# Patient Record
Sex: FEMALE | Race: WHITE | Employment: STUDENT | URBAN - METROPOLITAN AREA
[De-identification: names, ages, dates, MRNs, and addresses within clinical notes are randomized per-mention and may not be internally consistent; named-entity substitution may affect disease eponyms.]

---

## 2018-04-30 ENCOUNTER — OFFICE VISIT (OUTPATIENT)
Dept: PEDIATRICS | Facility: CLINIC | Age: 23
End: 2018-04-30
Payer: COMMERCIAL

## 2018-04-30 ENCOUNTER — TELEPHONE (OUTPATIENT)
Dept: OBGYN | Facility: CLINIC | Age: 23
End: 2018-04-30

## 2018-04-30 VITALS
DIASTOLIC BLOOD PRESSURE: 76 MMHG | HEIGHT: 64 IN | HEART RATE: 99 BPM | OXYGEN SATURATION: 100 % | BODY MASS INDEX: 22.71 KG/M2 | TEMPERATURE: 98.2 F | SYSTOLIC BLOOD PRESSURE: 130 MMHG | WEIGHT: 133 LBS

## 2018-04-30 DIAGNOSIS — S20.162A: Primary | ICD-10-CM
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
## 2018-04-30 DIAGNOSIS — Z34.90 SUPERVISION OF NORMAL PREGNANCY: Primary | ICD-10-CM

## 2018-04-30 PROCEDURE — 99203 OFFICE O/P NEW LOW 30 MIN: CPT | Performed by: PHYSICIAN ASSISTANT

## 2018-04-30 RX ORDER — AMOXICILLIN 500 MG/1
500 CAPSULE ORAL 3 TIMES DAILY
Qty: 30 CAPSULE | Refills: 0 | Status: SHIPPED | OUTPATIENT
Start: 2018-04-30 | End: 2018-08-21

## 2018-04-30 NOTE — TELEPHONE ENCOUNTER
Patient calling:  LMP 3/19/18  GA 6w0d  Was camping this weekend in [facility].  Found a tick under breast today.  Red area a little bigger that a pencil eraser. With a small pink area around red.  She does have the tick. (looks like a wood tick)  Not sure if head is out.  Appt made for pt to be seen today.  Katie Calhoun RN

## 2018-04-30 NOTE — PROGRESS NOTES
"  SUBJECTIVE:   Sylvia Leonard is a 23 year old female who presents to clinic today for the following health issues:    Found tick under left breast today. Removed today.   Hiking over the past two days in [place], [place].   Redness and swelling at site.   No fevers, chills, fatigue.   Pregnant: 6 weeks     ROS:  ROS otherwise negative    OBJECTIVE:                                                    /76 (BP Location: Right arm, Cuff Size: Adult Regular)  Pulse 99  Temp 98.2  F (36.8  C) (Oral)  Ht 5' 4\" (1.626 m)  Wt 133 lb (60.3 kg)  SpO2 100%  BMI 22.83 kg/m2  Body mass index is 22.83 kg/(m^2).   GENERAL: alert, no distress  HENT: ear canals- normal; TMs- normal; Nose- normal; Mouth- no ulcers, no lesions  NECK: no tenderness, no adenopathy  RESP: lungs clear to auscultation - no rales, no rhonchi, no wheezes  CV: regular rates and rhythm, normal S1 S2, no S3 or S4 and no murmur, no click or rub  SKIN: inspection of the left inferior breast reveals a 1 cm round erythemic lesion with lighter erythema surrounding. No tenderness to palpation. No remnants remaining.    Diagnostic test results:  No results found for this or any previous visit (from the past 24 hour(s)).     ASSESSMENT/PLAN:                                                    (S20.162A,  W57.XXXA) Tick bite of left female breast, initial encounter  (primary encounter diagnosis)  Comment: patient brought in live deer tick--slightly engorged. Proceed with treatment.   Plan: amoxicillin (AMOXIL) 500 MG capsule          Deejay Mohan PA-C  [facility]  "

## 2018-04-30 NOTE — PROGRESS NOTES
"  SUBJECTIVE:   Sylvia Leonard is a 23 year old female who presents to clinic today for the following health issues:      Concern - insect bite  Onset: ***    Description:   ***    Intensity: {.:689699}    Progression of Symptoms:  {.:399272}    Accompanying Signs & Symptoms:  ***    Previous history of similar problem:   ***    Precipitating factors:   Worsened by: ***    Alleviating factors:  Improved by: ***    Therapies Tried and outcome: ***    {additional problems for provider to add:594198}    Problem list and histories reviewed & adjusted, as indicated.  Additional history: {NONE - AS DOCUMENTED:044638::\"as documented\"}    {HIST REVIEW/ LINKS 2:693232}    Reviewed and updated as needed this visit by clinical staff       Reviewed and updated as needed this visit by Provider         {PROVIDER CHARTING PREFERENCE:631597}  "

## 2018-04-30 NOTE — MR AVS SNAPSHOT
After Visit Summary   4/30/2018    Sylvia Leonard    MRN: [id number]           Patient Information     Date Of Birth          1995        Visit Information        Provider Department      4/30/2018 4:30 PM Deejay Mohan PA-C [facility]        Today's Diagnoses     Tick bite of left female breast, initial encounter    -  1      Care Instructions    Begin antibiotics   Take with food  Call with any questions          Follow-ups after your visit        Your next 10 appointments already scheduled     Apr 30, 2018  4:30 PM CDT   SHORT with Deejay Mohan PA-C   [facility] ([facility])    [address]  [address]  [facility] [address]   [phone]            May 10, 2018  9:00 AM CDT   New Prenatal with RI PRENATAL NURSE   [facility]    [address] [place] [address] [phone]            May 15, 2018  1:15 PM CDT   (Arrive by 1:00 PM)   Ultrasound with RIUS1   [facility]    [address]  [address]  [facility] [address] [phone]            May 29, 2018  1:30 PM CDT   New Prenatal with Amada Krause MD   [facility]    [address] [place] [address] [phone]              Future tests that were ordered for you today     Open Future Orders        Priority Expected Expires Ordered    Hepatitis B surface antigen Routine  6/30/2018 4/30/2018    CBC with platelets Routine  6/30/2018 4/30/2018    Anti Treponema Routine  6/30/2018 4/30/2018    ABO/Rh type and screen Routine  6/30/2018 4/30/2018    HIV Antigen Antibody Combo Routine  6/30/2018 4/30/2018    Beta HCG qual IFA urine - [facility] and [place] Routine  6/30/2018 4/30/2018    Rubella Antibody IgG Quantitative Routine  6/30/2018 4/30/2018    Urine Culture Aerobic  "Bacterial Routine  2018            Who to contact     If you have questions or need follow up information about today's clinic visit or your schedule please contact [facility] directly at [phone].  Normal or non-critical lab and imaging results will be communicated to you by MyChart, letter or phone within 4 business days after the clinic has received the results. If you do not hear from us within 7 days, please contact the clinic through MyChart or phone. If you have a critical or abnormal lab result, we will notify you by phone as soon as possible.  Submit refill requests through [email or web address] or call your pharmacy and they will forward the refill request to us. Please allow 3 business days for your refill to be completed.          Additional Information About Your Visit        [email or web address] Information     [email or web address] lets you send messages to your doctor, view your test results, renew your prescriptions, schedule appointments and more. To sign up, go to [email or web address] . Click on \"Log in\" on the left side of the screen, which will take you to the Welcome page. Then click on \"Sign up Now\" on the right side of the page.     You will be asked to enter the access code listed below, as well as some personal information. Please follow the directions to create your username and password.     Your access code is: [email or web address]  Expires: 2018  4:18 PM     Your access code will  in 90 days. If you need help or a new code, please call your [place] clinic or [phone].        Care EveryWhere ID     This is your Care EveryWhere ID. This could be used by other organizations to access your [place] medical records  [id number]        Your Vitals Were     Pulse Temperature Height Pulse Oximetry BMI (Body Mass Index)       99 98.2  F (36.8  C) (Oral) 5' 4\" (1.626 m) 100% 22.83 kg/m2        Blood Pressure from Last 3 Encounters:   18 130/76    Weight from Last 3 Encounters:   18 " 133 lb (60.3 kg)              Today, you had the following     No orders found for display         Today's Medication Changes          These changes are accurate as of 4/30/18  4:18 PM.  If you have any questions, ask your nurse or doctor.               Start taking these medicines.        Dose/Directions    amoxicillin 500 MG capsule   Commonly known as:  AMOXIL   Used for:  Tick bite of left female breast, initial encounter   Started by:  Deejay Mohan PA-C        Dose:  500 mg   Take 1 capsule (500 mg) by mouth 3 times daily   Quantity:  30 capsule   Refills:  0            Where to get your medicines      These medications were sent to [place] [address] [place] [facility] [id number] [facility] [address]    Hours:  Tech issues with their phone system Phone:  [phone]     amoxicillin 500 MG capsule                Primary Care Provider Fax #    Physician No Ref-Primary [phone]       No address on file        Equal Access to Services     [facility] : Hadturner Hines, waaxda ludelphine, qaybta kaalmada adejoséyalatosha, milagros cabrera . So [facility] [phone].    ATENCIÓN: Si habla español, tiene a arzate disposición servicios gratuitos de asistencia lingüística. Wally al [phone].    We comply with applicable federal civil rights laws and [place] laws. We do not discriminate on the basis of race, color, national origin, age, disability, sex, sexual orientation, or gender identity.            Thank you!     Thank you for choosing [facility]  for your care. Our goal is always to provide you with excellent care. Hearing back from our patients is one way we can continue to improve our services. Please take a few minutes to complete the written survey that you may receive in the mail after your visit with us. Thank you!             Your Updated Medication List - Protect others around you: Learn how to safely use, store and  throw away your medicines at www.disposemymeds.org.          This list is accurate as of 4/30/18  4:18 PM.  Always use your most recent med list.                   Brand Name Dispense Instructions for use Diagnosis    amoxicillin 500 MG capsule    AMOXIL    30 capsule    Take 1 capsule (500 mg) by mouth 3 times daily    Tick bite of left female breast, initial encounter

## 2018-05-10 ENCOUNTER — PRENATAL OFFICE VISIT (OUTPATIENT)
Dept: NURSING | Facility: CLINIC | Age: 23
End: 2018-05-10
Payer: COMMERCIAL

## 2018-05-10 DIAGNOSIS — Z34.90 SUPERVISION OF NORMAL PREGNANCY: Primary | ICD-10-CM

## 2018-05-10 LAB
ABO + RH BLD: NORMAL
ABO + RH BLD: NORMAL
BETA HCG QUAL IFA URINE: POSITIVE
BLD GP AB SCN SERPL QL: NORMAL
BLOOD BANK CMNT PATIENT-IMP: NORMAL
ERYTHROCYTE [DISTWIDTH] IN BLOOD BY AUTOMATED COUNT: 11.9 % (ref 10–15)
HCT VFR BLD AUTO: 35.8 % (ref 35–47)
HGB BLD-MCNC: 12.8 G/DL (ref 11.7–15.7)
MCH RBC QN AUTO: 31.9 PG (ref 26.5–33)
MCHC RBC AUTO-ENTMCNC: 35.8 G/DL (ref 31.5–36.5)
MCV RBC AUTO: 89 FL (ref 78–100)
PLATELET # BLD AUTO: 249 10E9/L (ref 150–450)
RBC # BLD AUTO: 4.01 10E12/L (ref 3.8–5.2)
SPECIMEN EXP DATE BLD: NORMAL
WBC # BLD AUTO: 5.1 10E9/L (ref 4–11)

## 2018-05-10 PROCEDURE — 87086 URINE CULTURE/COLONY COUNT: CPT | Performed by: OBSTETRICS & GYNECOLOGY

## 2018-05-10 PROCEDURE — 86900 BLOOD TYPING SEROLOGIC ABO: CPT | Performed by: OBSTETRICS & GYNECOLOGY

## 2018-05-10 PROCEDURE — 85027 COMPLETE CBC AUTOMATED: CPT | Performed by: OBSTETRICS & GYNECOLOGY

## 2018-05-10 PROCEDURE — 86762 RUBELLA ANTIBODY: CPT | Performed by: OBSTETRICS & GYNECOLOGY

## 2018-05-10 PROCEDURE — 87340 HEPATITIS B SURFACE AG IA: CPT | Performed by: OBSTETRICS & GYNECOLOGY

## 2018-05-10 PROCEDURE — 86901 BLOOD TYPING SEROLOGIC RH(D): CPT | Performed by: OBSTETRICS & GYNECOLOGY

## 2018-05-10 PROCEDURE — 84703 CHORIONIC GONADOTROPIN ASSAY: CPT | Performed by: OBSTETRICS & GYNECOLOGY

## 2018-05-10 PROCEDURE — 86850 RBC ANTIBODY SCREEN: CPT | Performed by: OBSTETRICS & GYNECOLOGY

## 2018-05-10 PROCEDURE — 86780 TREPONEMA PALLIDUM: CPT | Performed by: OBSTETRICS & GYNECOLOGY

## 2018-05-10 PROCEDURE — 99207 ZZC NO CHARGE NURSE ONLY: CPT

## 2018-05-10 PROCEDURE — 87389 HIV-1 AG W/HIV-1&-2 AB AG IA: CPT | Performed by: OBSTETRICS & GYNECOLOGY

## 2018-05-10 PROCEDURE — 36415 COLL VENOUS BLD VENIPUNCTURE: CPT | Performed by: OBSTETRICS & GYNECOLOGY

## 2018-05-10 RX ORDER — PRENATAL VIT/IRON FUM/FOLIC AC 27MG-0.8MG
1 TABLET ORAL DAILY
Qty: 100 TABLET | Refills: 3 | COMMUNITY
Start: 2018-05-10

## 2018-05-10 NOTE — MR AVS SNAPSHOT
After Visit Summary   5/10/2018    Sylvia Leonard    MRN: [id number]           Patient Information     Date Of Birth          1995        Visit Information        Provider Department      5/10/2018 9:00 AM RI PRENATAL NURSE [facility]        Today's Diagnoses     Supervision of normal pregnancy    -  1       Follow-ups after your visit        Your next 10 appointments already scheduled     May 15, 2018  1:15 PM CDT   Ultrasound with RIUS1   [facility]    [address]  [facility] [id number] [phone]            May 29, 2018  1:30 PM CDT   New Prenatal with Amada Krause MD   [facility]    [address] [place] [address] [phone]              Future tests that were ordered for you today     Open Future Orders        Priority Expected Expires Ordered    US OB <14 Weeks w Transvaginal Single Routine  5/10/2019 5/10/2018            Who to contact     If you have questions or need follow up information about today's clinic visit or your schedule please contact [facility] directly at [phone].  Normal or non-critical lab and imaging results will be communicated to you by MyChart, letter or phone within 4 business days after the clinic has received the results. If you do not hear from us within 7 days, please contact the clinic through Around Knowledgehart or phone. If you have a critical or abnormal lab result, we will notify you by phone as soon as possible.  Submit refill requests through [employer] or call your pharmacy and they will forward the refill request to us. Please allow 3 business days for your refill to be completed.          Additional Information About Your Visit        MyChart Information     [employer] gives you secure access to your electronic health record. If you see a primary care provider, you can also send  messages to your care team and make appointments. If you have questions, please call your primary care clinic.  If you do not have a primary care provider, please call [phone] and they will assist you.        Care EveryWhere ID     This is your Care EveryWhere ID. This could be used by other organizations to access your [place] medical records  [id number]        Your Vitals Were     Last Period                   03/19/2018 (Exact Date)            Blood Pressure from Last 3 Encounters:   04/30/18 130/76    Weight from Last 3 Encounters:   04/30/18 133 lb (60.3 kg)              We Performed the Following     ABO/Rh type and screen     Beta HCG qual IFA urine - [facility] and [place]     CBC with platelets     Hepatitis B surface antigen     HIV Antigen Antibody Combo     Rubella Antibody IgG Quantitative     Treponema Abs w Reflex to RPR and Titer     Urine Culture Aerobic Bacterial        Primary Care Provider Fax #    Physician No Ref-Primary [phone]       No address on file        Equal Access to Services     CELIA KHAN : Hadii aad ku hadasho Soomaali, waaxda luqadaha, qaybta kaalmada adeegyada, waxay dkin hayhuyn louann cabrera . So [facility] [phone].    ATENCIÓN: Si habla español, tiene a arzate disposición servicios gratuitos de asistencia lingüística. Arletame al [phone].    We comply with applicable federal civil rights laws and [place] laws. We do not discriminate on the basis of race, color, national origin, age, disability, sex, sexual orientation, or gender identity.            Thank you!     Thank you for choosing [facility]  for your care. Our goal is always to provide you with excellent care. Hearing back from our patients is one way we can continue to improve our services. Please take a few minutes to complete the written survey that you may receive in the mail after your visit with us. Thank you!             Your Updated Medication List - Protect others around you:  Learn how to safely use, store and throw away your medicines at www.disposemymeds.org.          This list is accurate as of 5/10/18 10:41 AM.  Always use your most recent med list.                   Brand Name Dispense Instructions for use Diagnosis    amoxicillin 500 MG capsule    AMOXIL    30 capsule    Take 1 capsule (500 mg) by mouth 3 times daily    Tick bite of left female breast, initial encounter       prenatal multivitamin plus iron 27-0.8 MG Tabs per tablet     100 tablet    Take 1 tablet by mouth daily

## 2018-05-10 NOTE — NURSING NOTE
NPN nurse visit. 1st dr visit scheduled for 5/29/18 with Amada Krause M.D. Pap due.   7w3d      MATT Vogel RN

## 2018-05-11 LAB
BACTERIA SPEC CULT: NORMAL
HBV SURFACE AG SERPL QL IA: NONREACTIVE
HIV 1+2 AB+HIV1 P24 AG SERPL QL IA: NONREACTIVE
RUBV IGG SERPL IA-ACNC: 26 IU/ML
SPECIMEN SOURCE: NORMAL
T PALLIDUM AB SER QL: NONREACTIVE

## 2018-05-15 ENCOUNTER — HEALTH MAINTENANCE LETTER (OUTPATIENT)
Age: 23
End: 2018-05-15

## 2018-05-15 DIAGNOSIS — Z34.90 SUPERVISION OF NORMAL PREGNANCY: ICD-10-CM

## 2018-05-29 ENCOUNTER — PRENATAL OFFICE VISIT (OUTPATIENT)
Dept: OBGYN | Facility: CLINIC | Age: 23
End: 2018-05-29
Payer: COMMERCIAL

## 2018-05-29 VITALS
DIASTOLIC BLOOD PRESSURE: 70 MMHG | SYSTOLIC BLOOD PRESSURE: 122 MMHG | BODY MASS INDEX: 22.88 KG/M2 | HEIGHT: 64 IN | WEIGHT: 134 LBS

## 2018-05-29 DIAGNOSIS — Z12.4 SCREENING FOR MALIGNANT NEOPLASM OF CERVIX: ICD-10-CM

## 2018-05-29 DIAGNOSIS — Z11.3 SCREEN FOR STD (SEXUALLY TRANSMITTED DISEASE): ICD-10-CM

## 2018-05-29 DIAGNOSIS — Z34.81 ENCOUNTER FOR SUPERVISION OF OTHER NORMAL PREGNANCY IN FIRST TRIMESTER: Primary | ICD-10-CM

## 2018-05-29 PROBLEM — Z34.80 SUPERVISION OF OTHER NORMAL PREGNANCY, ANTEPARTUM: Status: ACTIVE | Noted: 2018-05-29

## 2018-05-29 PROCEDURE — 87591 N.GONORRHOEAE DNA AMP PROB: CPT | Performed by: OBSTETRICS & GYNECOLOGY

## 2018-05-29 PROCEDURE — 99207 ZZC FIRST OB VISIT: CPT | Performed by: OBSTETRICS & GYNECOLOGY

## 2018-05-29 PROCEDURE — 87491 CHLMYD TRACH DNA AMP PROBE: CPT | Performed by: OBSTETRICS & GYNECOLOGY

## 2018-05-29 PROCEDURE — G0145 SCR C/V CYTO,THINLAYER,RESCR: HCPCS | Performed by: OBSTETRICS & GYNECOLOGY

## 2018-05-29 NOTE — MR AVS SNAPSHOT
After Visit Summary   5/29/2018    Sylvia Leonard    MRN: [id number]           Patient Information     Date Of Birth          1995        Visit Information        Provider Department      5/29/2018 1:30 PM Amada Krause MD [facility]        Today's Diagnoses     Encounter for supervision of other normal pregnancy in first trimester    -  1    Screening for malignant neoplasm of cervix        Screen for STD (sexually transmitted disease)           Follow-ups after your visit        Your next 10 appointments already scheduled     Jun 26, 2018 11:00 AM CDT   ESTABLISHED PRENATAL with [facility]ada Krause MD   [facility]    [address]  [facility] [address]   [phone]            Jul 24, 2018  3:00 PM CDT   ESTABLISHED PRENATAL with Amada Krause MD   [facility]    [address]  [facility] [address]   [phone]              Future tests that were ordered for you today     Open Future Orders        Priority Expected Expires Ordered    Non Invasive Prenatal Test Cell Free DNA Routine  5/29/2019 5/29/2018            Who to contact     If you have questions or need follow up information about today's clinic visit or your schedule please contact [facility] directly at [phone].  Normal or non-critical lab and imaging results will be communicated to you by MyChart, letter or phone within 4 business days after the clinic has received the results. If you do not hear from us within 7 days, please contact the clinic through MyChart or phone. If you have a critical or abnormal lab result, we will notify you by phone as soon as possible.  Submit refill requests through Active Endpoints or call your pharmacy and they will forward the refill request to us. Please allow 3 business days for your refill to be completed.          Additional Information About Your  "Visit        MyChart Information     InVenturehart gives you secure access to your electronic health record. If you see a primary care provider, you can also send messages to your care team and make appointments. If you have questions, please call your primary care clinic.  If you do not have a primary care provider, please call [phone] and they will assist you.        Care EveryWhere ID     This is your Care EveryWhere ID. This could be used by other organizations to access your [place] medical records  [id number]        Your Vitals Were     Height Last Period Breastfeeding? BMI (Body Mass Index)          5' 4\" (1.626 m) 03/19/2018 (Exact Date) No 23 kg/m2         Blood Pressure from Last 3 Encounters:   05/29/18 122/70   04/30/18 130/76    Weight from Last 3 Encounters:   05/29/18 134 lb (60.8 kg)   04/30/18 133 lb (60.3 kg)              We Performed the Following     CHLAMYDIA TRACHOMATIS PCR     NEISSERIA GONORRHOEA PCR     Pap imaged thin layer screen reflex to HPV if ASCUS - recommend age 25 - 29        Primary Care Provider Fax #    Physician No Ref-Primary [phone]       No address on file        Equal Access to Services     [facility] : Hadii lorenzo sandoval Sosamir, waaxda ludelphine, qaybta kaalmada adam, milagros cabrera . So [facility] [phone].    ATENCIÓN: Si habla español, tiene a arzate disposición servicios gratuitos de asistencia lingüística. Llame al [phone].    We comply with applicable federal civil rights laws and [place] laws. We do not discriminate on the basis of race, color, national origin, age, disability, sex, sexual orientation, or gender identity.            Thank you!     Thank you for choosing [facility]  for your care. Our goal is always to provide you with excellent care. Hearing back from our patients is one way we can continue to improve our services. Please take a few minutes to complete the written survey that you may " receive in the mail after your visit with us. Thank you!             Your Updated Medication List - Protect others around you: Learn how to safely use, store and throw away your medicines at www.disposemymeds.org.          This list is accurate as of 5/29/18  2:39 PM.  Always use your most recent med list.                   Brand Name Dispense Instructions for use Diagnosis    amoxicillin 500 MG capsule    AMOXIL    30 capsule    Take 1 capsule (500 mg) by mouth 3 times daily    Tick bite of left female breast, initial encounter       prenatal multivitamin plus iron 27-0.8 MG Tabs per tablet     100 tablet    Take 1 tablet by mouth daily

## 2018-05-29 NOTE — PROGRESS NOTES
This is a 23 year old female patient,   who presents for her first obstetrical visit. This pregnancy is Planned, Desired.    EDC Dec 24, 2018 by LMP and Previous US which makes her 10w1d  today.  Her cycles are regular.  Her last menstrual period was normal. Since her LMP, she has had no complaints.  She denies nausea and emesis. Ultrasound in the 1st trimester showed EDC consistent with dates by LMP.     This is her first pregnancy    Since her last LMP she denies use of alcohol, tobacco and street drugs.    HISTORY:  Obstetric History       T0      L0     SAB0   TAB0   Ectopic0   Multiple0   Live Births0       # Outcome Date GA Lbr Jose/2nd Weight Sex Delivery Anes PTL Lv   2 Current            1 AB 10/2013 5w0d               Past Medical History:   Diagnosis Date     Migraine      Past Surgical History:   Procedure Laterality Date     NO HISTORY OF SURGERY       Family History   Problem Relation Age of Onset     Hypertension Mother      Hyperlipidemia Mother      CEREBROVASCULAR DISEASE Maternal Grandmother      Social History     Social History     Marital status:      Spouse name: Jose Manuel     Number of children: N/A     Years of education: N/A     Social History Main Topics     Smoking status: Never Smoker     Smokeless tobacco: Never Used     Alcohol use No     Drug use: No     Sexual activity: Yes     Partners: Male     Other Topics Concern     None     Social History Narrative     Current Outpatient Prescriptions   Medication Sig     Prenatal Vit-Fe Fumarate-FA (PRENATAL MULTIVITAMIN PLUS IRON) 27-0.8 MG TABS per tablet Take 1 tablet by mouth daily     amoxicillin (AMOXIL) 500 MG capsule Take 1 capsule (500 mg) by mouth 3 times daily (Patient not taking: Reported on 2018)     No current facility-administered medications for this visit.      No Known Allergies    Past medical, surgical, social and family history were reviewed and updated in EPIC.    ROS:   12 point review of  "systems negative other than symptoms noted below.    EXAM:  /70  Ht 5' 4\" (1.626 m)  Wt 134 lb (60.8 kg)  LMP 03/19/2018 (Exact Date)  Breastfeeding? No  BMI 23 kg/m2   BMI: Body mass index is 23 kg/(m^2).    EXAM:  Constitutional: Appearance: Well nourished, well developed alert, in no acute distress  Chest:  Respiratory Effort:  Breathing unlabored  Cardiovascular:Heart    Auscultation:  Regular rate, normal rhythm, no murmurs present  Gastrointestinal:  Abdominal Examination:  Abdomen nontender to palpation, tone normal without     rigidity or guarding, no masses present, umbilicus without lesions    Liver and speen:  No hepatomegaly present, liver nontender to palpation    Hernias:  No hernias present    FHTs auscultated at 178.  Skin:  General Inspection:  No rashes present, no lesions present, no areas of  discoloration.  Neurologic/Psychiatric:    Mental Status:  Oriented X3       Pelvis: External genitalia, Bartholin, urethral, and [place] glands within normal limits. Urethra is without lesion and nontender to palpation. Bladder is nontender. On speculum exam, cervix is without lesion and vagina is normal without lesion or discharge. Pap smear obtained without incident.    ASSESSMENT:      ICD-10-CM    1. Encounter for supervision of other normal pregnancy in first trimester Z34.81        PLAN:    Prenatal labs reviewed. She has no questions.    Discussed options for screening for and diagnosis of chromosomal anomalies, including first trimester screen, noninvasive prenatal testing/cell-free fetal DNA testing, CVS/amniocentesis, quad screen, and ultrasound or comprehensive Level II US at 18-20 weeks. She is electing noninvasive prenatal testing and Preparent screening as well.    Reviewed early pregnancy education, diet, exercise, prenatal vitamins, intercourse. Reviewed the call schedule, labor and delivery, and the schedule of prenatal visits.    Follow up in 4 weeks. She is encouraged to call " sooner with questions or concerns.      Amada Krause MD

## 2018-05-29 NOTE — NURSING NOTE
"Chief Complaint   Patient presents with     Prenatal Care       Initial /70  Ht 5' 4\" (1.626 m)  Wt 134 lb (60.8 kg)  LMP 2018 (Exact Date)  Breastfeeding? No  BMI 23 kg/m2 Estimated body mass index is 23 kg/(m^2) as calculated from the following:    Height as of this encounter: 5' 4\" (1.626 m).    Weight as of this encounter: 134 lb (60.8 kg).  BP completed using cuff size: regular        The following HM Due: pap smear      10w1d  - nausea or vomiting  - bleeding  + mild cramping  + occas. Headache  + fatigue  Zehra Jain LPN               "

## 2018-05-30 LAB
C TRACH DNA SPEC QL NAA+PROBE: NEGATIVE
N GONORRHOEA DNA SPEC QL NAA+PROBE: NEGATIVE
SPECIMEN SOURCE: NORMAL
SPECIMEN SOURCE: NORMAL

## 2018-05-31 LAB
COPATH REPORT: NORMAL
PAP: NORMAL

## 2018-06-04 ENCOUNTER — OFFICE VISIT (OUTPATIENT)
Dept: NURSING | Facility: CLINIC | Age: 23
End: 2018-06-04
Payer: COMMERCIAL

## 2018-06-04 VITALS — HEIGHT: 64 IN | BODY MASS INDEX: 23.23 KG/M2 | WEIGHT: 136.1 LBS

## 2018-06-04 DIAGNOSIS — Z34.81 ENCOUNTER FOR SUPERVISION OF OTHER NORMAL PREGNANCY IN FIRST TRIMESTER: ICD-10-CM

## 2018-06-04 DIAGNOSIS — Z34.80 SUPERVISION OF OTHER NORMAL PREGNANCY, ANTEPARTUM: Primary | ICD-10-CM

## 2018-06-04 LAB — MISCELLANEOUS TEST: NORMAL

## 2018-06-04 PROCEDURE — 36415 COLL VENOUS BLD VENIPUNCTURE: CPT | Performed by: OBSTETRICS & GYNECOLOGY

## 2018-06-04 PROCEDURE — 99000 SPECIMEN HANDLING OFFICE-LAB: CPT | Performed by: OBSTETRICS & GYNECOLOGY

## 2018-06-04 PROCEDURE — 40000791 ZZHCL STATISTIC VERIFI PRENATAL TRISOMY 21,18,13: Mod: 90 | Performed by: OBSTETRICS & GYNECOLOGY

## 2018-06-04 PROCEDURE — 99207 ZZC NO CHARGE NURSE ONLY: CPT

## 2018-06-04 NOTE — NURSING NOTE
Nurse only Preparent Standard panel and Innatal testing. Progenity form completed and orders released. Pt sent to lab for blood draw. Copy of order placed in RN basket.  Radha Carvalho CMA

## 2018-06-04 NOTE — MR AVS SNAPSHOT
After Visit Summary   6/4/2018    Sylvia Leonard    MRN: [id number]           Patient Information     Date Of Birth          1995        Visit Information        Provider Department      6/4/2018 9:30 AM RI OB NURSE [facility]        Today's Diagnoses     Supervision of other normal pregnancy, antepartum    -  1    Encounter for supervision of other normal pregnancy in first trimester           Follow-ups after your visit        Your next 10 appointments already scheduled     Jun 26, 2018 11:00 AM CDT   ESTABLISHED PRENATAL with Amada Karuse MD   [facility] ([facility])    [address]  [facility] [id number] [phone]            Jul 24, 2018  3:00 PM CDT   ESTABLISHED PRENATAL with Amada Krause MD   [facility] ([facility])    [address]  [facility] [id number] [phone]              Who to contact     If you have questions or need follow up information about today's clinic visit or your schedule please contact [facility] directly at [phone].  Normal or non-critical lab and imaging results will be communicated to you by Nanotronics Imaginghart, letter or phone within 4 business days after the clinic has received the results. If you do not hear from us within 7 days, please contact the clinic through Mobimt or phone. If you have a critical or abnormal lab result, we will notify you by phone as soon as possible.  Submit refill requests through CrowdTunes or call your pharmacy and they will forward the refill request to us. Please allow 3 business days for your refill to be completed.          Additional Information About Your Visit        Nanotronics Imaginghart Information     CrowdTunes gives you secure access to your electronic health record. If you see a primary care provider, you can also send messages to your care team and make appointments. If you have questions, please call your  "primary care clinic.  If you do not have a primary care provider, please call [phone] and they will assist you.        Care EveryWhere ID     This is your Care EveryWhere ID. This could be used by other organizations to access your [place] medical records  [id number]        Your Vitals Were     Height Last Period BMI (Body Mass Index)             5' 4\" (1.626 m) 03/19/2018 (Exact Date) 23.36 kg/m2          Blood Pressure from Last 3 Encounters:   05/29/18 122/70   04/30/18 130/76    Weight from Last 3 Encounters:   06/04/18 136 lb 1.6 oz (61.7 kg)   05/29/18 134 lb (60.8 kg)   04/30/18 133 lb (60.3 kg)              We Performed the Following     Non Invasive Prenatal Test Cell Free DNA     Preparent screening: Laboratory Miscellaneous Order        Primary Care Provider Fax #    Physician No Ref-Primary [phone]       No address on file        Equal Access to Services     CELIA KHAN : Hadturner núñezo Sosamir, waaxda luqadaha, qaybta kaalmada adeflo, milagros cabrera . So [facility] [phone].    ATENCIÓN: Si cathyla hafsa, tiene a arzate disposición servicios gratuitos de asistencia lingüística. Llame al [phone].    We comply with applicable federal civil rights laws and [place] laws. We do not discriminate on the basis of race, color, national origin, age, disability, sex, sexual orientation, or gender identity.            Thank you!     Thank you for choosing [facility]  for your care. Our goal is always to provide you with excellent care. Hearing back from our patients is one way we can continue to improve our services. Please take a few minutes to complete the written survey that you may receive in the mail after your visit with us. Thank you!             Your Updated Medication List - Protect others around you: Learn how to safely use, store and throw away your medicines at www.disposemymeds.org.          This list is accurate as of 6/4/18  9:40 AM. "  Always use your most recent med list.                   Brand Name Dispense Instructions for use Diagnosis    amoxicillin 500 MG capsule    AMOXIL    30 capsule    Take 1 capsule (500 mg) by mouth 3 times daily    Tick bite of left female breast, initial encounter       prenatal multivitamin plus iron 27-0.8 MG Tabs per tablet     100 tablet    Take 1 tablet by mouth daily

## 2018-06-11 ENCOUNTER — TELEPHONE (OUTPATIENT)
Dept: OBGYN | Facility: CLINIC | Age: 23
End: 2018-06-11

## 2018-06-11 DIAGNOSIS — Z34.80 SUPERVISION OF OTHER NORMAL PREGNANCY, ANTEPARTUM: ICD-10-CM

## 2018-06-11 NOTE — TELEPHONE ENCOUNTER
Results received from [facility] testing in [facility].  Testing done:  Innatal Prenatal Screen and Preparent Carrier Screen    Action:  Spoke to pt and gave NORMAL results.    Gender:  **GIRL**  Gender revealed to pt on the phone.    Routed to provider as JONY Vogel RN

## 2018-06-26 ENCOUNTER — PRENATAL OFFICE VISIT (OUTPATIENT)
Dept: OBGYN | Facility: CLINIC | Age: 23
End: 2018-06-26
Payer: COMMERCIAL

## 2018-06-26 VITALS — BODY MASS INDEX: 23.17 KG/M2 | DIASTOLIC BLOOD PRESSURE: 68 MMHG | WEIGHT: 135 LBS | SYSTOLIC BLOOD PRESSURE: 128 MMHG

## 2018-06-26 DIAGNOSIS — Z34.80 SUPERVISION OF OTHER NORMAL PREGNANCY, ANTEPARTUM: ICD-10-CM

## 2018-06-26 PROCEDURE — 99207 ZZC PRENATAL VISIT: CPT | Performed by: OBSTETRICS & GYNECOLOGY

## 2018-06-26 NOTE — NURSING NOTE
"Chief Complaint   Patient presents with     Prenatal Care       Initial /68  Wt 135 lb (61.2 kg)  LMP 2018 (Exact Date)  Breastfeeding? No  BMI 23.17 kg/m2 Estimated body mass index is 23.17 kg/(m^2) as calculated from the following:    Height as of 18: 5' 4\" (1.626 m).    Weight as of this encounter: 135 lb (61.2 kg).  BP completed using cuff size: regular        14w1d  + headache - will try taking Tylenol  - cramping or bleeding  - nausea or vomiting  + fatigue - improving  Zehra Jain LPN               "

## 2018-06-26 NOTE — MR AVS SNAPSHOT
After Visit Summary   6/26/2018    Sylvia Leonard    MRN: [id number]           Patient Information     Date Of Birth          1995        Visit Information        Provider Department      6/26/2018 11:00 AM Amada Krause MD [facility]        Today's Diagnoses     Supervision of other normal pregnancy, antepartum           Follow-ups after your visit        Your next 10 appointments already scheduled     Jul 26, 2018  2:00 PM CDT   ESTABLISHED PRENATAL with Amada Krause MD   [facility] ([facility])    [address] [place] [address]   [phone]            Aug 08, 2018  9:00 AM CDT   US OB > 14 WEEKS COMPLETE SINGLE with RIUS1   [facility] ([facility])    [address]  [address]  [facility] [address] [phone]           Please bring a list of your medicines (including vitamins, minerals and over-the-counter drugs). Also, tell your doctor about any allergies you may have. Wear comfortable clothes and leave your valuables at home.  If you re less than 20 weeks drink four 8-ounce glasses of fluid an hour before your exam. If you need to empty your bladder before your exam, try to release only a little urine. Then, drink another glass of fluid.  You may have up to two family members in the exam room. If you bring a small child, an adult must be there to care for him or her.  Please call the Imaging Department at your exam site with any questions.            Aug 21, 2018 10:30 AM CDT   ESTABLISHED PRENATAL with Amada Krause MD   [facility] ([facility])    [address] [facility] [address]   [phone]            Sep 18, 2018  2:15 PM CDT   ESTABLISHED PRENATAL with Amada Krause MD   [facility] ([facility])    [address]  [facility] [address]   [phone]               Future tests that were ordered for you today     Open Future Orders        Priority Expected Expires Ordered    US OB > 14 Weeks Complete Single Routine  6/26/2019 6/26/2018            Who to contact     If you have questions or need follow up information about today's clinic visit or your schedule please contact [facility] directly at [phone].  Normal or non-critical lab and imaging results will be communicated to you by MyChart, letter or phone within 4 business days after the clinic has received the results. If you do not hear from us within 7 days, please contact the clinic through [employer]hart or phone. If you have a critical or abnormal lab result, we will notify you by phone as soon as possible.  Submit refill requests through [employer] or call your pharmacy and they will forward the refill request to us. Please allow 3 business days for your refill to be completed.          Additional Information About Your Visit        MyChart Information     [employer] gives you secure access to your electronic health record. If you see a primary care provider, you can also send messages to your care team and make appointments. If you have questions, please call your primary care clinic.  If you do not have a primary care provider, please call [phone] and they will assist you.        Care EveryWhere ID     This is your Care EveryWhere ID. This could be used by other organizations to access your [place] medical records  [id number]        Your Vitals Were     Last Period Breastfeeding? BMI (Body Mass Index)             03/19/2018 (Exact Date) No 23.17 kg/m2          Blood Pressure from Last 3 Encounters:   06/26/18 128/68   05/29/18 122/70   04/30/18 130/76    Weight from Last 3 Encounters:   06/26/18 135 lb (61.2 kg)   06/04/18 136 lb 1.6 oz (61.7 kg)   05/29/18 134 lb (60.8 kg)               Primary Care Provider Fax #    Physician No Ref-Primary [phone]       No address on file        Equal  Access to Services     [facility]: Hadii aad ku hademmanuelmynor Hines, wanayanada luqadaha, qaybta kasilvianomilagros hall. So [facility] [phone].    ATENCIÓN: Si habla español, tiene a arzate disposición servicios gratuitos de asistencia lingüística. Llame al [phone].    We comply with applicable federal civil rights laws and [place] laws. We do not discriminate on the basis of race, color, national origin, age, disability, sex, sexual orientation, or gender identity.            Thank you!     Thank you for choosing [facility]  for your care. Our goal is always to provide you with excellent care. Hearing back from our patients is one way we can continue to improve our services. Please take a few minutes to complete the written survey that you may receive in the mail after your visit with us. Thank you!             Your Updated Medication List - Protect others around you: Learn how to safely use, store and throw away your medicines at www.disposemymeds.org.          This list is accurate as of 6/26/18  1:29 PM.  Always use your most recent med list.                   Brand Name Dispense Instructions for use Diagnosis    amoxicillin 500 MG capsule    AMOXIL    30 capsule    Take 1 capsule (500 mg) by mouth 3 times daily    Tick bite of left female breast, initial encounter       prenatal multivitamin plus iron 27-0.8 MG Tabs per tablet     100 tablet    Take 1 tablet by mouth daily

## 2018-06-26 NOTE — PROGRESS NOTES
PROBLEM LIST  LABS: Apos/RI/cfDNA normal, GIRL    1. Low risk primip    Headaches, usually stress induced. Drinking lots of water, small amounts caffeine when she needs it. Has not tried tylenol yet. Discussed ways to relieve headache in pregnancy, when to call. Follow up in 4 weeks. AFP then. Anatomy scan ordered.    Amada Krause MD

## 2018-07-26 ENCOUNTER — PRENATAL OFFICE VISIT (OUTPATIENT)
Dept: OBGYN | Facility: CLINIC | Age: 23
End: 2018-07-26
Payer: COMMERCIAL

## 2018-07-26 VITALS — BODY MASS INDEX: 24.03 KG/M2 | SYSTOLIC BLOOD PRESSURE: 120 MMHG | DIASTOLIC BLOOD PRESSURE: 70 MMHG | WEIGHT: 140 LBS

## 2018-07-26 DIAGNOSIS — Z34.80 SUPERVISION OF OTHER NORMAL PREGNANCY, ANTEPARTUM: Primary | ICD-10-CM

## 2018-07-26 PROCEDURE — 99000 SPECIMEN HANDLING OFFICE-LAB: CPT | Performed by: OBSTETRICS & GYNECOLOGY

## 2018-07-26 PROCEDURE — 36415 COLL VENOUS BLD VENIPUNCTURE: CPT | Performed by: OBSTETRICS & GYNECOLOGY

## 2018-07-26 PROCEDURE — 99207 ZZC PRENATAL VISIT: CPT | Performed by: OBSTETRICS & GYNECOLOGY

## 2018-07-26 PROCEDURE — 82105 ALPHA-FETOPROTEIN SERUM: CPT | Mod: 90 | Performed by: OBSTETRICS & GYNECOLOGY

## 2018-07-26 NOTE — MR AVS SNAPSHOT
After Visit Summary   7/26/2018    Sylvia Leonard    MRN: [id number]           Patient Information     Date Of Birth          1995        Visit Information        Provider Department      7/26/2018 2:00 PM Amada Krause MD [facility]        Today's Diagnoses     Supervision of other normal pregnancy, antepartum    -  1       Follow-ups after your visit        Your next 10 appointments already scheduled     Aug 08, 2018  9:00 AM CDT   US OB > 14 WEEKS COMPLETE SINGLE with RIUS1   [facility]    [address] [facility] [address] [facility]  [address]  [facility] [address]   [phone]           Please bring a list of your medicines (including vitamins, minerals and over-the-counter drugs). Also, tell your doctor about any allergies you may have. Wear comfortable clothes and leave your valuables at home.  Drink four 8-ounce glasses of fluid an hour before your exam. If you need to empty your bladder before your exam, try to release only a little urine. Then, drink another glass of fluid.  You may have up to two family members in the exam room. If you bring a small child, an adult must be there to care for him or her. No video or camera photography during the procedure.  Please call the Imaging Department at your exam site with any questions.            Aug 21, 2018 10:30 AM CDT   ESTABLISHED PRENATAL with Amada Krause MD   [facility] [address]   [phone]            Sep 18, 2018  2:15 PM CDT   ESTABLISHED PRENATAL with Amada Krause MD   [facility]    [address] [facility] [address]   [phone]              Who to contact     If you have questions or need follow up information about today's clinic visit or your schedule please contact [facility] directly at  [phone].  Normal or non-critical lab and imaging results will be communicated to you by Luminescent Technologieshart, letter or phone within 4 business days after the clinic has received the results. If you do not hear from us within 7 days, please contact the clinic through Opezt or phone. If you have a critical or abnormal lab result, we will notify you by phone as soon as possible.  Submit refill requests through Knowledge Adventure or call your pharmacy and they will forward the refill request to us. Please allow 3 business days for your refill to be completed.          Additional Information About Your Visit        Lumines[employer] Technologieshart Information     Know[employer] Adventure gives you secure access to your electronic health record. If you see a primary care provider, you can also send messages to your care team and make appointments. If you have questions, please call your primary care clinic.  If you do not have a primary care provider, please call [phone] and they will assist you.        Care EveryWhere ID     This is your Care EveryWhere ID. This could be used by other organizations to access your [place] medical records  [id number]        Your Vitals Were     Last Period Breastfeeding? BMI (Body Mass Index)             03/19/2018 (Exact Date) No 24.03 kg/m2          Blood Pressure from Last 3 Encounters:   07/26/18 120/70   06/26/18 128/68   05/29/18 122/70    Weight from Last 3 Encounters:   07/26/18 140 lb (63.5 kg)   06/26/18 135 lb (61.2 kg)   06/04/18 136 lb 1.6 oz (61.7 kg)              We Performed the Following     AFP maternal screen (15-20 weeks)        Primary Care Provider Fax #    Physician No Ref-Primary [phone]       No address on file        Equal Access to Services     [facility] : Hadii lorenzo Hines, yossi rodriguez, milagros vigil. So [facility] [phone].    ATENCIÓN: Si habla español, tiene a arzate disposición servicios gratuitos de asistencia lingüística. Llame al  [phone].    We comply with applicable federal civil rights laws and [place] laws. We do not discriminate on the basis of race, color, national origin, age, disability, sex, sexual orientation, or gender identity.            Thank you!     Thank you for choosing [facility]  for your care. Our goal is always to provide you with excellent care. Hearing back from our patients is one way we can continue to improve our services. Please take a few minutes to complete the written survey that you may receive in the mail after your visit with us. Thank you!             Your Updated Medication List - Protect others around you: Learn how to safely use, store and throw away your medicines at www.disposemymeds.org.          This list is accurate as of 7/26/18  2:16 PM.  Always use your most recent med list.                   Brand Name Dispense Instructions for use Diagnosis    amoxicillin 500 MG capsule    AMOXIL    30 capsule    Take 1 capsule (500 mg) by mouth 3 times daily    Tick bite of left female breast, initial encounter       prenatal multivitamin plus iron 27-0.8 MG Tabs per tablet     100 tablet    Take 1 tablet by mouth daily

## 2018-07-26 NOTE — PROGRESS NOTES
PROBLEM LIST  LABS: Apos/RI/cfDNA normal, GIRL    1. Low risk primip    AFP today. US ordered for anatomy. Follow up with me in 4 weeks.    Amada Krause MD

## 2018-07-26 NOTE — NURSING NOTE
"Chief Complaint   Patient presents with     Prenatal Care       Initial /70  Wt 140 lb (63.5 kg)  LMP 2018 (Exact Date)  Breastfeeding? No  BMI 24.03 kg/m2 Estimated body mass index is 24.03 kg/(m^2) as calculated from the following:    Height as of 18: 5' 4\" (1.626 m).    Weight as of this encounter: 140 lb (63.5 kg).  BP completed using cuff size: regular        18w3d  + FM noted  - cramping or bleeding  + heartburn  - headache  - nausea or vomiting  Zehra Jain LPN                "

## 2018-07-28 LAB
# FETUSES US: NORMAL
# FETUSES: NORMAL
AFP ADJ MOM AMN: 1.22
AFP SERPL-MCNC: 58 NG/ML
AGE - REPORTED: 25.9 YR
CURRENT SMOKER: NO
CURRENT SMOKER: NO
DIABETES STATUS PATIENT: NO
FAMILY MEMBER DISEASES HX: NO
FAMILY MEMBER DISEASES HX: NO
GA METHOD: NORMAL
GA METHOD: NORMAL
GA: NORMAL WK
IDDM PATIENT QL: NO
INTEGRATED SCN PATIENT-IMP: NORMAL
LMP START DATE: NORMAL
MONOCHORIONIC TWINS: NO
SERVICE CMNT-IMP: NO
SPECIMEN DRAWN SERPL: NORMAL
VALPROIC/CARBAMAZEPINE STATUS: NO
WEIGHT UNITS: NORMAL

## 2018-08-08 ENCOUNTER — RADIANT APPOINTMENT (OUTPATIENT)
Dept: ULTRASOUND IMAGING | Facility: CLINIC | Age: 23
End: 2018-08-08
Attending: OBSTETRICS & GYNECOLOGY
Payer: COMMERCIAL

## 2018-08-08 DIAGNOSIS — Z34.80 SUPERVISION OF OTHER NORMAL PREGNANCY, ANTEPARTUM: ICD-10-CM

## 2018-08-08 PROCEDURE — 76805 OB US >/= 14 WKS SNGL FETUS: CPT | Performed by: OBSTETRICS & GYNECOLOGY

## 2018-08-21 ENCOUNTER — PRENATAL OFFICE VISIT (OUTPATIENT)
Dept: OBGYN | Facility: CLINIC | Age: 23
End: 2018-08-21
Payer: COMMERCIAL

## 2018-08-21 VITALS — DIASTOLIC BLOOD PRESSURE: 74 MMHG | WEIGHT: 141.1 LBS | BODY MASS INDEX: 24.22 KG/M2 | SYSTOLIC BLOOD PRESSURE: 118 MMHG

## 2018-08-21 DIAGNOSIS — Z34.80 SUPERVISION OF OTHER NORMAL PREGNANCY, ANTEPARTUM: ICD-10-CM

## 2018-08-21 PROCEDURE — 99207 ZZC PRENATAL VISIT: CPT | Performed by: OBSTETRICS & GYNECOLOGY

## 2018-08-21 NOTE — PROGRESS NOTES
PROBLEM LIST  LABS: Apos/RI/cfDNA normal, GIRL    1. Low risk primip      Doing well, normal FHTs. Follow up in 4 weeks.  Amada Krause MD

## 2018-08-21 NOTE — MR AVS SNAPSHOT
After Visit Summary   8/21/2018    Sylvia Leonard    MRN: [id number]           Patient Information     Date Of Birth          1995        Visit Information        Provider Department      8/21/2018 10:30 AM Amada Krause MD [facility]        Today's Diagnoses     Supervision of other normal pregnancy, antepartum           Follow-ups after your visit        Your next 10 appointments already scheduled     Sep 18, 2018  2:15 PM CDT   ESTABLISHED PRENATAL with Amada Krause MD   [facility] ([facility])    [address]  [address]  [facility] [id number] [phone]              Who to contact     If you have questions or need follow up information about today's clinic visit or your schedule please contact [facility] directly at [phone].  Normal or non-critical lab and imaging results will be communicated to you by MyChart, letter or phone within 4 business days after the clinic has received the results. If you do not hear from us within 7 days, please contact the clinic through MyChart or phone. If you have a critical or abnormal lab result, we will notify you by phone as soon as possible.  Submit refill requests through BeneChill or call your pharmacy and they will forward the refill request to us. Please allow 3 business days for your refill to be completed.          Additional Information About Your Visit        MyChart Information     B[employer]ill gives you secure access to your electronic health record. If you see a primary care provider, you can also send messages to your care team and make appointments. If you have questions, please call your primary care clinic.  If you do not have a primary care provider, please call [phone] and they will assist you.        Care EveryWhere ID     This is your Care EveryWhere ID. This could be used by other organizations to access your [facility]  records  [id number]        Your Vitals Were     Last Period BMI (Body Mass Index)                03/19/2018 (Exact Date) 24.22 kg/m2           Blood Pressure from Last 3 Encounters:   08/21/18 118/74   07/26/18 120/70   06/26/18 128/68    Weight from Last 3 Encounters:   08/21/18 141 lb 1.6 oz (64 kg)   07/26/18 140 lb (63.5 kg)   06/26/18 135 lb (61.2 kg)              Today, you had the following     No orders found for display       Primary Care Provider Fax #    Physician No Ref-Primary [phone]       No address on file        Equal Access to Services     ESTELLA KHAN : Nasrin Hines, yossi rodriguez, raymond medina, milagros cabrera . So [facility] [phone].    ATENCIÓN: Si habla español, tiene a arzate disposición servicios gratuitos de asistencia lingüística. Llame al [phone].    We comply with applicable federal civil rights laws and [place] laws. We do not discriminate on the basis of race, color, national origin, age, disability, sex, sexual orientation, or gender identity.            Thank you!     Thank you for choosing [facility]  for your care. Our goal is always to provide you with excellent care. Hearing back from our patients is one way we can continue to improve our services. Please take a few minutes to complete the written survey that you may receive in the mail after your visit with us. Thank you!             Your Updated Medication List - Protect others around you: Learn how to safely use, store and throw away your medicines at www.disposemymeds.org.          This list is accurate as of 8/21/18  2:38 PM.  Always use your most recent med list.                   Brand Name Dispense Instructions for use Diagnosis    prenatal multivitamin plus iron 27-0.8 MG Tabs per tablet     100 tablet    Take 1 tablet by mouth daily

## 2018-08-21 NOTE — NURSING NOTE
"  Chief Complaint   Patient presents with     Prenatal Care     22 weeks 1 day, no questions or concerns       Initial /74 (BP Location: Right arm, Patient Position: Chair, Cuff Size: Adult Regular)  Wt 141 lb 1.6 oz (64 kg)  LMP 03/19/2018 (Exact Date)  BMI 24.22 kg/m2 Estimated body mass index is 24.22 kg/(m^2) as calculated from the following:    Height as of 6/4/18: 5' 4\" (1.626 m).    Weight as of this encounter: 141 lb 1.6 oz (64 kg).  Medication Reconciliation: complete    Bruce Olivier CMA      "

## 2018-08-26 ENCOUNTER — NURSE TRIAGE (OUTPATIENT)
Dept: NURSING | Facility: CLINIC | Age: 23
End: 2018-08-26

## 2018-08-26 NOTE — TELEPHONE ENCOUNTER
Caller states that she is 23 weeks pregnant and has not has a stool in 2 days and has severe constipation and notes that she has tired milk of magnesia and also senekot without no results and notes that she is having severe rectal pressure and pain constantly, she notes that she has some abdominal pain with bearing down but no other abdominal pain, no vaginal bleeding or fluids. She notes that is is very painful and she is having difficulty walking due to the pressure. Reviewed guidelines below and recommended ER and caller agreed with plan of care.     Reason for Disposition    Severe rectal pain    Additional Information    Negative: [1] Abdominal pain AND [2] pregnant > 20 weeks    Negative: [1] Abdominal pain AND [2] pregnant < 20 weeks    Negative: Rectal bleeding or blood in stool is main symptom    Negative: Leakage of fluid from vagina    Negative: [1] Pregnant 23 or more weeks AND [2] baby is moving less today (e.g., kick count < 5 in 1 hour or < 10 in 2 hours)    Protocols used: PREGNANCY - CONSTIPATION-ADULT-    Kaylin Hernandez, RN, BSN  [facility]

## 2018-09-18 ENCOUNTER — PRENATAL OFFICE VISIT (OUTPATIENT)
Dept: OBGYN | Facility: CLINIC | Age: 23
End: 2018-09-18
Payer: COMMERCIAL

## 2018-09-18 VITALS — BODY MASS INDEX: 25.75 KG/M2 | WEIGHT: 150 LBS | SYSTOLIC BLOOD PRESSURE: 124 MMHG | DIASTOLIC BLOOD PRESSURE: 70 MMHG

## 2018-09-18 DIAGNOSIS — Z34.80 SUPERVISION OF OTHER NORMAL PREGNANCY, ANTEPARTUM: ICD-10-CM

## 2018-09-18 LAB — HGB BLD-MCNC: 11.9 G/DL (ref 11.7–15.7)

## 2018-09-18 PROCEDURE — 00000218 ZZHCL STATISTIC OBHBG - HEMOGLOBIN: Performed by: OBSTETRICS & GYNECOLOGY

## 2018-09-18 PROCEDURE — 86780 TREPONEMA PALLIDUM: CPT | Performed by: OBSTETRICS & GYNECOLOGY

## 2018-09-18 PROCEDURE — 82950 GLUCOSE TEST: CPT | Performed by: OBSTETRICS & GYNECOLOGY

## 2018-09-18 PROCEDURE — 99207 ZZC PRENATAL VISIT: CPT | Performed by: OBSTETRICS & GYNECOLOGY

## 2018-09-18 PROCEDURE — 36415 COLL VENOUS BLD VENIPUNCTURE: CPT | Performed by: OBSTETRICS & GYNECOLOGY

## 2018-09-18 NOTE — PROGRESS NOTES
PROBLEM LIST  LABS: Apos/RI/cfDNA normal, GIRL    1. Low risk primip    Prilosec for heartburn is working. Exercise has become harder with increased cramping in the low abdomen; note for gym given.  Amada Krause MD

## 2018-09-18 NOTE — LETTER
[facility] [address] [place]  [address]  [facility] [address]  Phone: [phone]    September 18, 2018        Sylvia Leonard  [address] [facility] [address]          To whom it may concern:    RE: Sylvia Leonard    Patient will be out for approx. 6 weeks from time of delivery.    Please contact me for questions or concerns.      Sincerely,        Amada Krause MD

## 2018-09-18 NOTE — MR AVS SNAPSHOT
After Visit Summary   9/18/2018    Sylvia Leonard    MRN: [id number]           Patient Information     Date Of Birth          1995        Visit Information        Provider Department      9/18/2018 2:15 PM Amada Krause MD [facility]        Today's Diagnoses     Supervision of other normal pregnancy, antepartum           Follow-ups after your visit        Your next 10 appointments already scheduled     Oct 02, 2018  2:15 PM CDT   ESTABLISHED PRENATAL with Amada Krause MD   [facility] ([facility])    [address]  [address] [place] [address]   [phone]            Oct 18, 2018 11:30 AM CDT   ESTABLISHED PRENATAL with Amada Krause MD   [facility] ([facility])    [address]  [address] [place] [id number] [phone]            Oct 30, 2018  2:15 PM CDT   ESTABLISHED PRENATAL with Amada Krause MD   [facility] ([facility])    [address]  [address] [place] [id number] [phone]            Nov 13, 2018  2:15 PM CST   ESTABLISHED PRENATAL with Amada Krause MD   [facility] ([facility])    [address]  [address] [place] [id number] [phone]            Nov 27, 2018  2:15 PM CST   ESTABLISHED PRENATAL with Amada Krause MD   [facility] ([facility])    [address]  [address] [place] [id number] [phone]              Who to contact     If you have questions or need follow up information about today's clinic visit or your schedule please contact [facility] directly at [phone].  Normal or non-critical lab and imaging results will be communicated to you by MyChart, letter or phone within 4 business days after the clinic has received the results. If you do not hear from us within 7  days, please contact the clinic through NEMOPTIC or phone. If you have a critical or abnormal lab result, we will notify you by phone as soon as possible.  Submit refill requests through NEMOPTIC or call your pharmacy and they will forward the refill request to us. Please allow 3 business days for your refill to be completed.          Additional Information About Your Visit        Continental Wrestling Federationhart Information     NEMOPTIC gives you secure access to your electronic health record. If you see a primary care provider, you can also send messages to your care team and make appointments. If you have questions, please call your primary care clinic.  If you do not have a primary care provider, please call [phone] and they will assist you.        Care EveryWhere ID     This is your Care EveryWhere ID. This could be used by other organizations to access your [place] medical records  [id number]        Your Vitals Were     Last Period Breastfeeding? BMI (Body Mass Index)             03/19/2018 (Exact Date) No 25.75 kg/m2          Blood Pressure from Last 3 Encounters:   09/18/18 124/70   08/21/18 118/74   07/26/18 120/70    Weight from Last 3 Encounters:   09/18/18 150 lb (68 kg)   08/21/18 141 lb 1.6 oz (64 kg)   07/26/18 140 lb (63.5 kg)              We Performed the Following     Glucose tolerance, gest screen, 1 hour     OB hemoglobin     Treponema Abs w Reflex to RPR and Titer        Primary Care Provider Fax #    Physician No Ref-Primary [phone]       No address on file        Equal Access to Services     CELIA KHAN AH: Hadii lorenzo núñezo Sosamir, waaxda luqadaha, qaybta kaalmada adeegyada, milagros cabrera . So [facility] [phone].    ATENCIÓN: Si habla español, tiene a arzate disposición servicios gratuitos de asistencia lingüística. Llame al [phone].    We comply with applicable federal civil rights laws and [place] laws. We do not discriminate on the basis of race, color, national origin,  age, disability, sex, sexual orientation, or gender identity.            Thank you!     Thank you for choosing [facility]  for your care. Our goal is always to provide you with excellent care. Hearing back from our patients is one way we can continue to improve our services. Please take a few minutes to complete the written survey that you may receive in the mail after your visit with us. Thank you!             Your Updated Medication List - Protect others around you: Learn how to safely use, store and throw away your medicines at www.disposemymeds.org.          This list is accurate as of 9/18/18 11:59 PM.  Always use your most recent med list.                   Brand Name Dispense Instructions for use Diagnosis    prenatal multivitamin plus iron 27-0.8 MG Tabs per tablet     100 tablet    Take 1 tablet by mouth daily        PRILOSEC OTC PO      Take 20 mg by mouth daily

## 2018-09-18 NOTE — NURSING NOTE
"Chief Complaint   Patient presents with     Prenatal Care       Initial /70  Wt 150 lb (68 kg)  LMP 2018 (Exact Date)  Breastfeeding? No  BMI 25.75 kg/m2 Estimated body mass index is 25.75 kg/(m^2) as calculated from the following:    Height as of 18: 5' 4\" (1.626 m).    Weight as of this encounter: 150 lb (68 kg).  BP completed using cuff size: regular          26w1d  + FM noted  + heartburn  + constipation  + cramping  - bleeding  + headache occas.  Zerha Jain LPN               "

## 2018-09-18 NOTE — LETTER
[facility] [address]  [address]  [facility] [address]  Phone: [phone]    September 18, 2018        Sylvia Leonard  [address] [facility] [address]          To whom it may concern:    RE: Sylvia Leonard    Please allow patient to suspend her membership until after delivery due pain during workout.     Please contact me for questions or concerns.      Sincerely,        Amada Krause MD

## 2018-09-19 LAB
GLUCOSE 1H P 50 G GLC PO SERPL-MCNC: 119 MG/DL (ref 60–129)
T PALLIDUM AB SER QL: NONREACTIVE

## 2018-10-02 ENCOUNTER — PRENATAL OFFICE VISIT (OUTPATIENT)
Dept: OBGYN | Facility: CLINIC | Age: 23
End: 2018-10-02
Payer: COMMERCIAL

## 2018-10-02 VITALS — BODY MASS INDEX: 25.88 KG/M2 | WEIGHT: 150.8 LBS | SYSTOLIC BLOOD PRESSURE: 120 MMHG | DIASTOLIC BLOOD PRESSURE: 74 MMHG

## 2018-10-02 DIAGNOSIS — Z23 NEED FOR TDAP VACCINATION: ICD-10-CM

## 2018-10-02 DIAGNOSIS — Z34.80 SUPERVISION OF OTHER NORMAL PREGNANCY, ANTEPARTUM: Primary | ICD-10-CM

## 2018-10-02 DIAGNOSIS — Z23 NEED FOR PROPHYLACTIC VACCINATION AND INOCULATION AGAINST INFLUENZA: ICD-10-CM

## 2018-10-02 PROCEDURE — 90471 IMMUNIZATION ADMIN: CPT | Performed by: OBSTETRICS & GYNECOLOGY

## 2018-10-02 PROCEDURE — 90715 TDAP VACCINE 7 YRS/> IM: CPT | Performed by: OBSTETRICS & GYNECOLOGY

## 2018-10-02 PROCEDURE — 99207 ZZC PRENATAL VISIT: CPT | Performed by: OBSTETRICS & GYNECOLOGY

## 2018-10-02 PROCEDURE — 90472 IMMUNIZATION ADMIN EACH ADD: CPT | Performed by: OBSTETRICS & GYNECOLOGY

## 2018-10-02 PROCEDURE — 90686 IIV4 VACC NO PRSV 0.5 ML IM: CPT | Performed by: OBSTETRICS & GYNECOLOGY

## 2018-10-02 NOTE — PATIENT INSTRUCTIONS
Behavioral strategies for management of restless leg syndrome:  ?Mental alerting activities, such as working on a computer or doing crossword puzzles, at times of rest or boredom  ?Moderate regular exercise  ?Reduced caffeine intake  ?For symptomatic relief - walking, bicycling, soaking the affected limbs, and leg massage    Avoidance of aggravating factors -- Sleep deprivation is known to aggravate symptoms of restless legs syndrome (RLS) in many patients, and general principles of sleep hygiene can be helpful.    Recommendations for better sleep:  1. Establish a regular sleep schedule and try to stick to it, even on weekends.   2. If you nap during the day, limit it to 20 or 30 minutes, preferably early in the afternoon.   3. Avoid alcohol in the evening, as it can disrupt sleep.   4. Don t eat a big meal just before bedtime, but don t go to bed hungry, either. Eat a light snack before bed, if needed, preferably one high in carbohydrates.   5. If you use medications that are stimulants, take them in the morning, or ask your doctor if you can switch to a nonstimulating alternative. If you use drugs that cause drowsiness, take them in the evening.   6. Get regular exercise during the day, but avoid vigorous exercise within three hours of bedtime.   7. If pressing thoughts interfere with falling asleep, write them down (keep a pad and pen next to the bed) and try to forget about them until morning.   8. If you are frequently awakened by a need to use the bathroom, cut down on how much you drink after dinner time.   9. If you smoke, quit. Among other hazards, nicotine is a stimulant and can cause nightmares.   10. Avoid beverages and foods containing caffeine after 3 p.m. Even decaf versions have some caffeine, which can bother some people.

## 2018-10-02 NOTE — PROGRESS NOTES
PROBLEM LIST  LABS: Apos/RI/cfDNA normal, GIRL    1. Low risk primip    Prilosec for heartburn is working well. Struggling with restless leg at night  Maria Eugenia Pisano MD

## 2018-10-02 NOTE — NURSING NOTE
"  Chief Complaint   Patient presents with     Prenatal Care     28 weeks 1 day, no c/o vaginal bleeding, leaking of fluids, contractions.Feeling movement regularly.      Patient Request for Note/Letter     for gym membership       Initial /74 (BP Location: Left arm, Patient Position: Chair, Cuff Size: Adult Regular)  Wt 150 lb 12.8 oz (68.4 kg)  LMP 03/19/2018 (Exact Date)  BMI 25.88 kg/m2 Estimated body mass index is 25.88 kg/(m^2) as calculated from the following:    Height as of 6/4/18: 5' 4\" (1.626 m).    Weight as of this encounter: 150 lb 12.8 oz (68.4 kg).  Medication Reconciliation: complete      Bruce Olivier CMA      "

## 2018-10-02 NOTE — MR AVS SNAPSHOT
After Visit Summary   10/2/2018    Sylvia Leonard    MRN: [id number]           Patient Information     Date Of Birth          1995        Visit Information        Provider Department      10/2/2018 2:15 PM Maria Eugenia Pisano MD [facility]        Today's Diagnoses     Supervision of other normal pregnancy, antepartum    -  1      Care Instructions    Behavioral strategies for management of restless leg syndrome:  ?Mental alerting activities, such as working on a computer or doing crossword puzzles, at times of rest or boredom  ?Moderate regular exercise  ?Reduced caffeine intake  ?For symptomatic relief - walking, bicycling, soaking the affected limbs, and leg massage    Avoidance of aggravating factors -- Sleep deprivation is known to aggravate symptoms of restless legs syndrome (RLS) in many patients, and general principles of sleep hygiene can be helpful.    Recommendations for better sleep:  1. Establish a regular sleep schedule and try to stick to it, even on weekends.   2. If you nap during the day, limit it to 20 or 30 minutes, preferably early in the afternoon.   3. Avoid alcohol in the evening, as it can disrupt sleep.   4. Don t eat a big meal just before bedtime, but don t go to bed hungry, either. Eat a light snack before bed, if needed, preferably one high in carbohydrates.   5. If you use medications that are stimulants, take them in the morning, or ask your doctor if you can switch to a nonstimulating alternative. If you use drugs that cause drowsiness, take them in the evening.   6. Get regular exercise during the day, but avoid vigorous exercise within three hours of bedtime.   7. If pressing thoughts interfere with falling asleep, write them down (keep a pad and pen next to the bed) and try to forget about them until morning.   8. If you are frequently awakened by a need to use the bathroom, cut down on how much you drink after dinner time.   9. If you smoke, quit.  Among other hazards, nicotine is a stimulant and can cause nightmares.   10. Avoid beverages and foods containing caffeine after 3 p.m. Even decaf versions have some caffeine, which can bother some people.              Follow-ups after your visit        Your next 10 appointments already scheduled     Oct 18, 2018 11:30 AM CDT   ESTABLISHED PRENATAL with Amada Krause MD   [facility])    [address]  [address] [place] [address]   [phone]            Oct 30, 2018  2:15 PM CDT   ESTABLISHED PRENATAL with Amada Krause MD   [facility] ([facility])    [address]  [address] [place] [id number] [phone]            Nov 13, 2018  2:15 PM CST   ESTABLISHED PRENATAL with Amada Krause MD   [facility] ([facility])    [address]  [address] [place] [address]   [phone]            Nov 27, 2018  2:15 PM CST   ESTABLISHED PRENATAL with Amada Krause MD   [facility] ([facility])    [address]  [address] [place] [id number] [phone]              Who to contact     If you have questions or need follow up information about today's clinic visit or your schedule please contact [facility] directly at [phone].  Normal or non-critical lab and imaging results will be communicated to you by MyChart, letter or phone within 4 business days after the clinic has received the results. If you do not hear from us within 7 days, please contact the clinic through The Loadownhart or phone. If you have a critical or abnormal lab result, we will notify you by phone as soon as possible.  Submit refill requests through Info or call your pharmacy and they will forward the refill request to us. Please allow 3 business days for your refill to be completed.          Additional Information About  Your Visit        RevolutionCredithart Information     1Rebel gives you secure access to your electronic health record. If you see a primary care provider, you can also send messages to your care team and make appointments. If you have questions, please call your primary care clinic.  If you do not have a primary care provider, please call [phone] and they will assist you.        Care EveryWhere ID     This is your Care EveryWhere ID. This could be used by other organizations to access your [place] medical records  [id number]        Your Vitals Were     Last Period BMI (Body Mass Index)                03/19/2018 (Exact Date) 25.88 kg/m2           Blood Pressure from Last 3 Encounters:   10/02/18 120/74   09/18/18 124/70   08/21/18 118/74    Weight from Last 3 Encounters:   10/02/18 150 lb 12.8 oz (68.4 kg)   09/18/18 150 lb (68 kg)   08/21/18 141 lb 1.6 oz (64 kg)              Today, you had the following     No orders found for display       Primary Care Provider Fax #    Physician No Ref-Primary [phone]       No address on file        Equal Access to Services     [facility] : Hadii lorenzo Hines, wanayanada michael, qaguy medina, milagros cabrera . So [facility] [phone].    ATENCIÓN: Si habla español, tiene a arzate disposición servicios gratWhiteHat Securityos de asistencia lingüística. Wally al [phone].    We comply with applicable federal civil rights laws and [place] laws. We do not discriminate on the basis of race, color, national origin, age, disability, sex, sexual orientation, or gender identity.            Thank you!     Thank you for choosing [facility]  for your care. Our goal is always to provide you with excellent care. Hearing back from our patients is one way we can continue to improve our services. Please take a few minutes to complete the written survey that you may receive in the mail after your visit with us. Thank you!             Your  Updated Medication List - Protect others around you: Learn how to safely use, store and throw away your medicines at www.disposemymeds.org.          This list is accurate as of 10/2/18  3:00 PM.  Always use your most recent med list.                   Brand Name Dispense Instructions for use Diagnosis    prenatal multivitamin plus iron 27-0.8 MG Tabs per tablet     100 tablet    Take 1 tablet by mouth daily        PRILOSEC OTC PO      Take 20 mg by mouth daily

## 2018-10-02 NOTE — PROGRESS NOTES

## 2018-10-02 NOTE — LETTER
[place] [address]  [address]  [facility] [address]  Phone: [phone]    October 2, 2018        [address]yl[place] Leonard  [address] [address] [facility] [address]          To whom it may concern:    RE: Sylvia Leonard    RE: Sylvia Leonard    Please allow patient to suspend her membership from September 2018 until February 2019 due to pain during workout.     Please contact me for questions or concerns.      Sincerely,        Amada Krause MD

## 2018-10-18 ENCOUNTER — PRENATAL OFFICE VISIT (OUTPATIENT)
Dept: OBGYN | Facility: CLINIC | Age: 23
End: 2018-10-18
Payer: COMMERCIAL

## 2018-10-18 VITALS — SYSTOLIC BLOOD PRESSURE: 126 MMHG | BODY MASS INDEX: 26.09 KG/M2 | WEIGHT: 152 LBS | DIASTOLIC BLOOD PRESSURE: 70 MMHG

## 2018-10-18 DIAGNOSIS — Z34.80 SUPERVISION OF OTHER NORMAL PREGNANCY, ANTEPARTUM: ICD-10-CM

## 2018-10-18 PROCEDURE — 99207 ZZC PRENATAL VISIT: CPT | Performed by: OBSTETRICS & GYNECOLOGY

## 2018-10-18 NOTE — MR AVS SNAPSHOT
After Visit Summary   10/18/2018    Sylvia Leonard    MRN: [id number]           Patient Information     Date Of Birth          1995        Visit Information        Provider Department      10/18/2018 11:30 AM Amada Krause MD [facility]        Today's Diagnoses     Supervision of other normal pregnancy, antepartum           Follow-ups after your visit        Your next 10 appointments already scheduled     Oct 30, 2018  2:15 PM CDT   ESTABLISHED PRENATAL with Amada Krause MD   [facility] ([facility])    [address]  [address] [place] [id number] [phone]            Nov 13, 2018  2:15 PM CST   ESTABLISHED PRENATAL with Amada Krause MD   [facility] ([facility])    [address]  [address] [place] [id number] [phone]            Nov 27, 2018  2:15 PM CST   ESTABLISHED PRENATAL with Amada Krause MD   [facility] ([facility])    [address]  [address] [place] [id number] [phone]              Who to contact     If you have questions or need follow up information about today's clinic visit or your schedule please contact [facility] directly at [phone].  Normal or non-critical lab and imaging results will be communicated to you by MyChart, letter or phone within 4 business days after the clinic has received the results. If you do not hear from us within 7 days, please contact the clinic through LapSpacehart or phone. If you have a critical or abnormal lab result, we will notify you by phone as soon as possible.  Submit refill requests through cottonTracks or call your pharmacy and they will forward the refill request to us. Please allow 3 business days for your refill to be completed.          Additional Information About Your Visit        cottonTracks Information     cottonTracks gives you secure access to  your electronic health record. If you see a primary care provider, you can also send messages to your care team and make appointments. If you have questions, please call your primary care clinic.  If you do not have a primary care provider, please call [phone] and they will assist you.        Care EveryWhere ID     This is your Care EveryWhere ID. This could be used by other organizations to access your [place] medical records  [id number]        Your Vitals Were     Last Period Breastfeeding? BMI (Body Mass Index)             03/19/2018 (Exact Date) No 26.09 kg/m2          Blood Pressure from Last 3 Encounters:   10/18/18 126/70   10/02/18 120/74   09/18/18 124/70    Weight from Last 3 Encounters:   10/18/18 152 lb (68.9 kg)   10/02/18 150 lb 12.8 oz (68.4 kg)   09/18/18 150 lb (68 kg)              Today, you had the following     No orders found for display       Primary Care Provider Fax #    Physician No Ref-Primary [phone]       No address on file        Equal Access to Services     [facility] : Hadii lorenzo núñezo Sosamir, waaxda luqadaha, qaybta kaalmada adejoséyalatosha, milagros cabrera . So [facility] [phone].    ATENCIÓN: Si habla español, tiene a arzate disposición servicios gratuitos de asistencia lingüística. Llame al [phone].    We comply with applicable federal civil rights laws and [place] laws. We do not discriminate on the basis of race, color, national origin, age, disability, sex, sexual orientation, or gender identity.            Thank you!     Thank you for choosing [facility]  for your care. Our goal is always to provide you with excellent care. Hearing back from our patients is one way we can continue to improve our services. Please take a few minutes to complete the written survey that you may receive in the mail after your visit with us. Thank you!             Your Updated Medication List - Protect others around you: Learn how to safely  use, store and throw away your medicines at www.disposemymeds.org.          This list is accurate as of 10/18/18  1:00 PM.  Always use your most recent med list.                   Brand Name Dispense Instructions for use Diagnosis    prenatal multivitamin plus iron 27-0.8 MG Tabs per tablet     100 tablet    Take 1 tablet by mouth daily        PRILOSEC OTC PO      Take 20 mg by mouth daily

## 2018-10-18 NOTE — NURSING NOTE
"Chief Complaint   Patient presents with     Prenatal Care       Initial /70  Wt 152 lb (68.9 kg)  LMP 2018 (Exact Date)  Breastfeeding? No  BMI 26.09 kg/m2 Estimated body mass index is 26.09 kg/(m^2) as calculated from the following:    Height as of 18: 5' 4\" (1.626 m).    Weight as of this encounter: 152 lb (68.9 kg).  BP completed using cuff size: regular          30w3d  + FM daily  + mild cramping  - bleeding or leaking of fluid  - edema  + restless legs at night  - heartburn with prilosec  Zehra Jain LPN                "

## 2018-10-18 NOTE — PROGRESS NOTES
PROBLEM LIST  LABS: Apos/RI/cfDNA normal, GIRL    1. Low risk primip    Prilosec for heartburn is working well. Struggling with restless leg at night, discussed magnesium supplementation 400-500 mg nightly as well as stretching and warm bath before bed. She will try this.    Amada Krause MD

## 2018-10-30 ENCOUNTER — PRENATAL OFFICE VISIT (OUTPATIENT)
Dept: OBGYN | Facility: CLINIC | Age: 23
End: 2018-10-30
Payer: COMMERCIAL

## 2018-10-30 VITALS
WEIGHT: 158 LBS | SYSTOLIC BLOOD PRESSURE: 118 MMHG | HEART RATE: 80 BPM | DIASTOLIC BLOOD PRESSURE: 70 MMHG | BODY MASS INDEX: 27.12 KG/M2

## 2018-10-30 DIAGNOSIS — Z34.80 SUPERVISION OF OTHER NORMAL PREGNANCY, ANTEPARTUM: ICD-10-CM

## 2018-10-30 PROCEDURE — 99207 ZZC PRENATAL VISIT: CPT | Performed by: OBSTETRICS & GYNECOLOGY

## 2018-10-30 RX ORDER — BREAST PUMP
1 EACH MISCELLANEOUS PRN
Qty: 1 EACH | Refills: 0 | Status: SHIPPED | OUTPATIENT
Start: 2018-10-30 | End: 2019-11-07

## 2018-10-30 NOTE — PROGRESS NOTES
PROBLEM LIST  LABS: Apos/RI/cfDNA normal, GIRL    1. Low risk primip    Doing well, no concerns. Follow up in 2 weeks.    Amada Krause MD

## 2018-10-30 NOTE — MR AVS SNAPSHOT
After Visit Summary   10/30/2018    Sylvia Leonard    MRN: [id number]           Patient Information     Date Of Birth          1995        Visit Information        Provider Department      10/30/2018 2:15 PM Amada Krause MD [facility]        Today's Diagnoses     Supervision of other normal pregnancy, antepartum           Follow-ups after your visit        Your next 10 appointments already scheduled     Nov 13, 2018  2:15 PM CST   ESTABLISHED PRENATAL with Amada Krause MD   [facility] ([facility])    [address]  [address] [place] [id number] [phone]            Nov 27, 2018  2:15 PM CST   ESTABLISHED PRENATAL with Amada Krause MD   [facility] ([facility])    [address]  [address] [place] [id number] [phone]              Who to contact     If you have questions or need follow up information about today's clinic visit or your schedule please contact [facility] directly at [phone].  Normal or non-critical lab and imaging results will be communicated to you by MyChart, letter or phone within 4 business days after the clinic has received the results. If you do not hear from us within 7 days, please contact the clinic through Auto Securehart or phone. If you have a critical or abnormal lab result, we will notify you by phone as soon as possible.  Submit refill requests through Dentalink or call your pharmacy and they will forward the refill request to us. Please allow 3 business days for your refill to be completed.          Additional Information About Your Visit        Auto Securehart Information     Dentalink gives you secure access to your electronic health record. If you see a primary care provider, you can also send messages to your care team and make appointments. If you have questions, please call your primary care clinic.  If you do not have a primary  care provider, please call [phone] and they will assist you.        Care EveryWhere ID     This is your Care EveryWhere ID. This could be used by other organizations to access your [facility] medical records  [id number]        Your Vitals Were     Pulse Last Period BMI (Body Mass Index)             80 03/19/2018 (Exact Date) 27.12 kg/m2          Blood Pressure from Last 3 Encounters:   10/30/18 118/70   10/18/18 126/70   10/02/18 120/74    Weight from Last 3 Encounters:   10/30/18 158 lb (71.7 kg)   10/18/18 152 lb (68.9 kg)   10/02/18 150 lb 12.8 oz (68.4 kg)              Today, you had the following     No orders found for display         Today's Medication Changes          These changes are accurate as of 10/30/18  4:57 PM.  If you have any questions, ask your nurse or doctor.               Start taking these medicines.        Dose/Directions    breast pump Misc   Used for:  Supervision of other normal pregnancy, antepartum   Started by:  Amada Krause MD        Dose:  1 each   1 each as needed   Quantity:  1 each   Refills:  0            Where to get your medicines      Some of these will need a paper prescription and others can be bought over the counter.  Ask your nurse if you have questions.     Bring a paper prescription for each of these medications     breast pump Misc                Primary Care Provider Fax #    Physician No Ref-Primary [phone]       No address on file        Equal Access to Services     CELIA KHAN : Hadii lorenzo Hines, waizzy rodriguez, qaybta milagros bonilal . So [facility] [phone].    ATENCIÓN: Si habla español, tiene a arzate disposición servicios gratuitos de asistencia lingüística. Llame al [phone].    We comply with applicable federal civil rights laws and [place] laws. We do not discriminate on the basis of race, color, national origin, age, disability, sex, sexual orientation, or gender identity.             Thank you!     Thank you for choosing [facility]  for your care. Our goal is always to provide you with excellent care. Hearing back from our patients is one way we can continue to improve our services. Please take a few minutes to complete the written survey that you may receive in the mail after your visit with us. Thank you!             Your Updated Medication List - Protect others around you: Learn how to safely use, store and throw away your medicines at www.disposemymeds.org.          This list is accurate as of 10/30/18  4:57 PM.  Always use your most recent med list.                   Brand Name Dispense Instructions for use Diagnosis    breast pump Misc     1 each    1 each as needed    Supervision of other normal pregnancy, antepartum       prenatal multivitamin plus iron 27-0.8 MG Tabs per tablet     100 tablet    Take 1 tablet by mouth daily        PRILOSEC OTC PO      Take 20 mg by mouth daily

## 2018-10-30 NOTE — NURSING NOTE
"Chief Complaint   Patient presents with     Prenatal Care     baby active and moving     32w1d    Initial /70 (BP Location: Right arm, Patient Position: Chair, Cuff Size: Adult Regular)  Pulse 80  Wt 158 lb (71.7 kg)  LMP 03/19/2018 (Exact Date)  BMI 27.12 kg/m2 Estimated body mass index is 27.12 kg/(m^2) as calculated from the following:    Height as of 6/4/18: 5' 4\" (1.626 m).    Weight as of this encounter: 158 lb (71.7 kg).  Medication Reconciliation: complete     Yolie Bowers ma      "

## 2018-11-13 ENCOUNTER — PRENATAL OFFICE VISIT (OUTPATIENT)
Dept: OBGYN | Facility: CLINIC | Age: 23
End: 2018-11-13
Payer: COMMERCIAL

## 2018-11-13 VITALS — DIASTOLIC BLOOD PRESSURE: 80 MMHG | WEIGHT: 163 LBS | BODY MASS INDEX: 27.98 KG/M2 | SYSTOLIC BLOOD PRESSURE: 122 MMHG

## 2018-11-13 DIAGNOSIS — Z34.80 SUPERVISION OF OTHER NORMAL PREGNANCY, ANTEPARTUM: ICD-10-CM

## 2018-11-13 PROCEDURE — 99207 ZZC PRENATAL VISIT: CPT | Performed by: OBSTETRICS & GYNECOLOGY

## 2018-11-13 NOTE — MR AVS SNAPSHOT
After Visit Summary   11/13/2018    Sylvia Leonard    MRN: [id number]           Patient Information     Date Of Birth          1995        Visit Information        Provider Department      11/13/2018 2:15 PM Amada Krause MD [facility]        Today's Diagnoses     Supervision of other normal pregnancy, antepartum           Follow-ups after your visit        Your next 10 appointments already scheduled     Nov 27, 2018  2:15 PM CST   ESTABLISHED PRENATAL with Amada Krause MD   [facility]    [address]  [address]  [facility] [id number] [phone]              Who to contact     If you have questions or need follow up information about today's clinic visit or your schedule please contact [facility] directly at [phone].  Normal or non-critical lab and imaging results will be communicated to you by MyChart, letter or phone within 4 business days after the clinic has received the results. If you do not hear from us within 7 days, please contact the clinic through MyChart or phone. If you have a critical or abnormal lab result, we will notify you by phone as soon as possible.  Submit refill requests through Assemblage or call your pharmacy and they will forward the refill request to us. Please allow 3 business days for your refill to be completed.          Additional Information About Your Visit        MyChart Information     Assemblage gives you secure access to your electronic health record. If you see a primary care provider, you can also send messages to your care team and make appointments. If you have questions, please call your primary care clinic.  If you do not have a primary care provider, please call [phone] and they will assist you.        Care EveryWhere ID     This is your Care EveryWhere ID. This could be used by other organizations to access your [facility]  records  [id number]        Your Vitals Were     Last Period Breastfeeding? BMI (Body Mass Index)             03/19/2018 (Exact Date) No 27.98 kg/m2          Blood Pressure from Last 3 Encounters:   11/13/18 122/80   10/30/18 118/70   10/18/18 126/70    Weight from Last 3 Encounters:   11/13/18 163 lb (73.9 kg)   10/30/18 158 lb (71.7 kg)   10/18/18 152 lb (68.9 kg)              Today, you had the following     No orders found for display       Primary Care Provider Fax #    Physician No Ref-Primary [phone]       No address on file        Equal Access to Services     ESTELLA KHAN : Nasrin Hines, yossi rodriguez, raymond medina, milagros cabrera . So [facility] [phone].    ATENCIÓN: Si habla español, tiene a arzate disposición servicios gratuitos de asistencia lingüística. Llame al [phone].    We comply with applicable federal civil rights laws and [place] laws. We do not discriminate on the basis of race, color, national origin, age, disability, sex, sexual orientation, or gender identity.            Thank you!     Thank you for choosing [facility]  for your care. Our goal is always to provide you with excellent care. Hearing back from our patients is one way we can continue to improve our services. Please take a few minutes to complete the written survey that you may receive in the mail after your visit with us. Thank you!             Your Updated Medication List - Protect others around you: Learn how to safely use, store and throw away your medicines at www.disposemymeds.org.          This list is accurate as of 11/13/18  2:37 PM.  Always use your most recent med list.                   Brand Name Dispense Instructions for use Diagnosis    breast pump Misc     1 each    1 each as needed    Supervision of other normal pregnancy, antepartum       prenatal multivitamin plus iron 27-0.8 MG Tabs per tablet     100 tablet    Take 1 tablet by  mouth daily        PRILOSEC OTC PO      Take 20 mg by mouth daily

## 2018-11-13 NOTE — NURSING NOTE
"Chief Complaint   Patient presents with     Prenatal Care       Initial /80  Wt 163 lb (73.9 kg)  LMP 2018 (Exact Date)  Breastfeeding? No  BMI 27.98 kg/m2 Estimated body mass index is 27.98 kg/(m^2) as calculated from the following:    Height as of 18: 5' 4\" (1.626 m).    Weight as of this encounter: 163 lb (73.9 kg).  BP completed using cuff size: regular    Questioned patient about current smoking habits.  Pt. has never smoked.          34w1d  + FM daily  + nose bleed  + L groin pain  + pelvic pressure  + mild cramping  ? Contractions  Zehra Jain LPN               "

## 2018-11-27 ENCOUNTER — HOSPITAL ENCOUNTER (OUTPATIENT)
Facility: CLINIC | Age: 23
Discharge: HOME | End: 2018-11-27
Attending: OBSTETRICS & GYNECOLOGY | Admitting: OBSTETRICS & GYNECOLOGY
Payer: COMMERCIAL

## 2018-11-27 ENCOUNTER — PRENATAL OFFICE VISIT (OUTPATIENT)
Dept: OBGYN | Facility: CLINIC | Age: 23
End: 2018-11-27
Payer: COMMERCIAL

## 2018-11-27 VITALS — SYSTOLIC BLOOD PRESSURE: 124 MMHG | DIASTOLIC BLOOD PRESSURE: 77 MMHG | RESPIRATION RATE: 16 BRPM | TEMPERATURE: 98.7 F

## 2018-11-27 VITALS — WEIGHT: 167 LBS | SYSTOLIC BLOOD PRESSURE: 152 MMHG | DIASTOLIC BLOOD PRESSURE: 94 MMHG | BODY MASS INDEX: 28.67 KG/M2

## 2018-11-27 DIAGNOSIS — O16.3 HYPERTENSION DURING PREGNANCY IN THIRD TRIMESTER, UNSPECIFIED HYPERTENSION IN PREGNANCY TYPE: ICD-10-CM

## 2018-11-27 DIAGNOSIS — Z34.80 SUPERVISION OF OTHER NORMAL PREGNANCY, ANTEPARTUM: Primary | ICD-10-CM

## 2018-11-27 LAB
ALBUMIN SERPL-MCNC: 3 G/DL (ref 3.4–5)
ALP SERPL-CCNC: 155 U/L (ref 40–150)
ALT SERPL W P-5'-P-CCNC: 24 U/L (ref 0–50)
ANION GAP SERPL CALCULATED.3IONS-SCNC: 10 MMOL/L (ref 3–14)
AST SERPL W P-5'-P-CCNC: 18 U/L (ref 0–45)
BILIRUB SERPL-MCNC: 0.3 MG/DL (ref 0.2–1.3)
BUN SERPL-MCNC: 8 MG/DL (ref 7–30)
CALCIUM SERPL-MCNC: 8.6 MG/DL (ref 8.5–10.1)
CHLORIDE SERPL-SCNC: 103 MMOL/L (ref 94–109)
CO2 SERPL-SCNC: 22 MMOL/L (ref 20–32)
CREAT SERPL-MCNC: 0.72 MG/DL (ref 0.52–1.04)
CREAT UR-MCNC: 57 MG/DL
ERYTHROCYTE [DISTWIDTH] IN BLOOD BY AUTOMATED COUNT: 11.9 % (ref 10–15)
GFR SERPL CREATININE-BSD FRML MDRD: >90 ML/MIN/1.7M2
GLUCOSE SERPL-MCNC: 74 MG/DL (ref 70–99)
HCT VFR BLD AUTO: 36.6 % (ref 35–47)
HGB BLD-MCNC: 12.1 G/DL (ref 11.7–15.7)
MCH RBC QN AUTO: 28.6 PG (ref 26.5–33)
MCHC RBC AUTO-ENTMCNC: 33.1 G/DL (ref 31.5–36.5)
MCV RBC AUTO: 87 FL (ref 78–100)
PLATELET # BLD AUTO: 210 10E9/L (ref 150–450)
POTASSIUM SERPL-SCNC: 3.9 MMOL/L (ref 3.4–5.3)
PROT SERPL-MCNC: 7.3 G/DL (ref 6.8–8.8)
PROT UR-MCNC: 0.14 G/L
PROT/CREAT 24H UR: 0.25 G/G CR (ref 0–0.2)
RBC # BLD AUTO: 4.23 10E12/L (ref 3.8–5.2)
SODIUM SERPL-SCNC: 135 MMOL/L (ref 133–144)
WBC # BLD AUTO: 9.6 10E9/L (ref 4–11)

## 2018-11-27 PROCEDURE — 99207 ZZC PRENATAL VISIT: CPT | Performed by: OBSTETRICS & GYNECOLOGY

## 2018-11-27 PROCEDURE — 99213 OFFICE O/P EST LOW 20 MIN: CPT | Performed by: OBSTETRICS & GYNECOLOGY

## 2018-11-27 PROCEDURE — 87653 STREP B DNA AMP PROBE: CPT | Performed by: OBSTETRICS & GYNECOLOGY

## 2018-11-27 PROCEDURE — 85027 COMPLETE CBC AUTOMATED: CPT | Performed by: OBSTETRICS & GYNECOLOGY

## 2018-11-27 PROCEDURE — 84156 ASSAY OF PROTEIN URINE: CPT | Performed by: OBSTETRICS & GYNECOLOGY

## 2018-11-27 PROCEDURE — 80053 COMPREHEN METABOLIC PANEL: CPT | Performed by: OBSTETRICS & GYNECOLOGY

## 2018-11-27 PROCEDURE — G0463 HOSPITAL OUTPT CLINIC VISIT: HCPCS

## 2018-11-27 PROCEDURE — 36415 COLL VENOUS BLD VENIPUNCTURE: CPT | Performed by: OBSTETRICS & GYNECOLOGY

## 2018-11-27 RX ORDER — ONDANSETRON 2 MG/ML
4 INJECTION INTRAMUSCULAR; INTRAVENOUS EVERY 6 HOURS PRN
Status: DISCONTINUED | OUTPATIENT
Start: 2018-11-27 | End: 2018-11-27 | Stop reason: HOSPADM

## 2018-11-27 NOTE — PROGRESS NOTES
Obstetrics Triage Note    HPI:  Sylvia Leonard is a 23 year old  female at 36w1d pregnancy uncomplicated to date here with complaints of elevated BP at routine office visit.  140/100 initially and 152/94 on repeat.    Patient denies HA, visual changes, RUQ pain or excessive edema.      Patient states that she has noticed normal FM.  She states that she has otherwise been feeling well. She denies fever, N/V, chest pain, SOB, abdominal pain, vaginal bleeding, vaginal discharge, dysuria, constipation.    ROS:  Negative except as mentioned in HPI.    PMH:  Past Medical History:   Diagnosis Date     Migraine        PSHx:  Past Surgical History:   Procedure Laterality Date     NO HISTORY OF SURGERY       wisdom teeth         Medications:    No current facility-administered medications on file prior to encounter.   Current Outpatient Prescriptions on File Prior to Encounter:  Omeprazole Magnesium (PRILOSEC OTC PO) Take 20 mg by mouth daily   Prenatal Vit-Fe Fumarate-FA (PRENATAL MULTIVITAMIN PLUS IRON) 27-0.8 MG TABS per tablet Take 1 tablet by mouth daily   Misc. Devices (BREAST PUMP) MISC 1 each as needed (Patient not taking: Reported on 2018)        Allergies:   No Known Allergies    Physical Exam:   Vitals:    18 1526 18 1536 18 1546 18 1557   BP: 150/69 137/68 131/68 136/76   Resp: 16 16 16 16   Temp:  98.7  F (37.1  C)        Gen: resting comfortably, in NAD  CV: RRR, no m/r/g  Pulm: CTAB, no increased work of breathing  Abd: soft, gravid, non-tender, non-distended  Cx: not checked in Triage    NST:  FHT: , moderate ricki, positive accels, no decels  [place]: occ cramps.  No regular ctx    Labs/Imaging:  Results for orders placed or performed during the hospital encounter of 18 (from the past 24 hour(s))   CBC with platelets   Result Value Ref Range    WBC 9.6 4.0 - 11.0 10e9/L    RBC Count 4.23 3.8 - 5.2 10e12/L    Hemoglobin 12.1 11.7 - 15.7 g/dL    Hematocrit 36.6 35.0  - 47.0 %    MCV 87 78 - 100 fl    MCH 28.6 26.5 - 33.0 pg    MCHC 33.1 31.5 - 36.5 g/dL    RDW 11.9 10.0 - 15.0 %    Platelet Count 210 150 - 450 10e9/L   Comprehensive metabolic panel   Result Value Ref Range    Sodium 135 133 - 144 mmol/L    Potassium 3.9 3.4 - 5.3 mmol/L    Chloride 103 94 - 109 mmol/L    Carbon Dioxide 22 20 - 32 mmol/L    Anion Gap 10 3 - 14 mmol/L    Glucose 74 70 - 99 mg/dL    Urea Nitrogen 8 7 - 30 mg/dL    Creatinine 0.72 0.52 - 1.04 mg/dL    GFR Estimate >90 >60 mL/min/1.7m2    GFR Estimate If Black >90 >60 mL/min/1.7m2    Calcium 8.6 8.5 - 10.1 mg/dL    Bilirubin Total 0.3 0.2 - 1.3 mg/dL    Albumin 3.0 (L) 3.4 - 5.0 g/dL    Protein Total 7.3 6.8 - 8.8 g/dL    Alkaline Phosphatase 155 (H) 40 - 150 U/L    ALT 24 0 - 50 U/L    AST 18 0 - 45 U/L       Assessment/Plan: Sylvia Leonard is a 23 year old female  at 36w1d here for PIH assessment  -Initial BP was elevated but all subsequent BPs normal range.  PIH labs all normal  - FWB: Cat I tracing, reactive with no regular ctx  - Dispo: to home with follow up in the next week. Discussed tylenol for pain as needed. Discussed labor and PIH warning signs and indication to return to care.    Candido Gautam MD  OB/GYN     2018 4:12 PM

## 2018-11-27 NOTE — PROGRESS NOTES
PROBLEM LIST  LABS: Apos/RI/cfDNA normal, GIRL    1. Low risk primip    Group B Strep today.  Elevated blood pressure today; no new symptoms (no headache, visual changes, RUQ or abdominal pain), some mild edema, unchanged.   BP (!) 152/94  Wt 167 lb (75.8 kg)  LMP 03/19/2018 (Exact Date)  BMI 28.67 kg/m2  Abdomen: nontender, gravid.  Ext: trace edema, brisk reflexes bilaterally, 1 beat of clonus bilaterally.    Plan:   To L&D for serial BPs, urine pr/cr ratio, CMP, cbc.   If no severe range pressures or lab abnormalities indicating preeclampsia with severe features or severe gestational hypertension, plan repeat visit on Thursday or Friday. If continued elevated blood pressure, plan delivery at 37 weeks or with onset of severe features.    Signs and symptoms of preeclampsia discussed in detail, when to call or come in, if she is discharged home.    Amada Krause MD

## 2018-11-27 NOTE — MR AVS SNAPSHOT
After Visit Summary   11/27/2018    Sylvia Leonard    MRN: [id number]           Patient Information     Date Of Birth          1995        Visit Information        Provider Department      11/27/2018 2:15 PM Amada Krause MD [facility]        Today's Diagnoses     Supervision of other normal pregnancy, antepartum    -  1    Hypertension during pregnancy in third trimester, unspecified hypertension in pregnancy type           Follow-ups after your visit        Your next 10 appointments already scheduled     Dec 04, 2018 10:30 AM CST   ESTABLISHED PRENATAL with Amada Krause MD   [facility] ([facility])    [address]  [address] [place] [id number]   [phone]            Dec 13, 2018  1:45 PM CST   ESTABLISHED PRENATAL with Amada Krause MD   [facility] ([facility])    [address]  [address] [place] [address]   [phone]            Dec 21, 2018 10:00 AM CST   ESTABLISHED PRENATAL with Amada Krause MD   [facility] ([facility])    [address]  [address] [place] [address]   [phone]              Who to contact     If you have questions or need follow up information about today's clinic visit or your schedule please contact [facility] directly at [phone].  Normal or non-critical lab and imaging results will be communicated to you by MyChart, letter or phone within 4 business days after the clinic has received the results. If you do not hear from us within 7 days, please contact the clinic through MyChart or phone. If you have a critical or abnormal lab result, we will notify you by phone as soon as possible.  Submit refill requests through HealthUnlocked or call your pharmacy and they will forward the refill request to us. Please allow 3 business days for your refill to be completed.           Additional Information About Your Visit        [employer] Information     [employer] gives you secure access to your electronic health record. If you see a primary care provider, you can also send messages to your care team and make appointments. If you have questions, please call your primary care clinic.  If you do not have a primary care provider, please call [phone] and they will assist you.        Care EveryWhere ID     This is your Care EveryWhere ID. This could be used by other organizations to access your [facility] medical records  [id number]        Your Vitals Were     Last Period BMI (Body Mass Index)                03/19/2018 (Exact Date) 28.67 kg/m2           Blood Pressure from Last 3 Encounters:   11/27/18 136/76   11/27/18 (!) 152/94   11/13/18 122/80    Weight from Last 3 Encounters:   11/27/18 167 lb (75.8 kg)   11/13/18 163 lb (73.9 kg)   10/30/18 158 lb (71.7 kg)              We Performed the Following     Group B strep PCR        Primary Care Provider Fax #    Physician No Ref-Primary [phone]       No address on file        Equal Access to Services     [facility] : Hadii lorenzo Hines, waizzy rodriguez, raymond medina, milagros cabrera . So [facility] [phone].    ATENCIÓN: Si habla español, tiene a arzate disposición servicios gratiKang Healthcare Groupos de asistencia lingüística. Wally al [phone].    We comply with applicable federal civil rights laws and [place] laws. We do not discriminate on the basis of race, color, national origin, age, disability, sex, sexual orientation, or gender identity.            Thank you!     Thank you for choosing [facility]  for your care. Our goal is always to provide you with excellent care. Hearing back from our patients is one way we can continue to improve our services. Please take a few minutes to complete the written survey that you may receive in the mail after your visit with us. Thank you!              Your Updated Medication List - Protect others around you: Learn how to safely use, store and throw away your medicines at www.disposemymeds.org.          This list is accurate as of 11/27/18  3:06 PM.  Always use your most recent med list.                   Brand Name Dispense Instructions for use Diagnosis    breast pump Misc     1 each    1 each as needed    Supervision of other normal pregnancy, antepartum       pediatric multivitamin w/iron 27-0.8 MG tablet     100 tablet    Take 1 tablet by mouth daily        PRILOSEC OTC PO      Take 20 mg by mouth daily

## 2018-11-27 NOTE — NURSING NOTE
"Chief Complaint   Patient presents with     Prenatal Care       Initial BP (!) 140/100  Wt 167 lb (75.8 kg)  LMP 2018 (Exact Date)  BMI 28.67 kg/m2 Estimated body mass index is 28.67 kg/(m^2) as calculated from the following:    Height as of 18: 5' 4\" (1.626 m).    Weight as of this encounter: 167 lb (75.8 kg).  BP completed using cuff size: regular    Questioned patient about current smoking habits.  Pt. has never smoked.          The following HM Due: NONE    BP is high  +ankle swelling  -headache  +bloody noses    Margaux Dueñas, TESHA      "

## 2018-11-27 NOTE — DISCHARGE INSTRUCTIONS
Discharge Instruction for Undelivered Patients      You were seen for: Blood Pressure evaluation  We Consulted: Dr Gautam  You had (Test or Medicine):Fetal monitoring, labwork     Diet:   Drink 8 to 12 glasses of liquids (milk, juice, water) every day.  You may eat meals and snacks.     Activity:  Call your doctor or nurse midwife if your baby is moving less than usual.     Call your provider if you notice:  Swelling in your face or increased swelling in your hands or legs.  Headaches that are not relieved by Tylenol (acetaminophen).  Changes in your vision (blurring: seeing spots or stars.)  Nausea (sick to your stomach) and vomiting (throwing up).   Weight gain of 5 pounds or more per week.  Heartburn that doesn't go away.  Signs of bladder infection: pain when you urinate (use the toilet), need to go more often and more urgently.  The bag of jang (rupture of membranes) breaks, or you notice leaking in your underwear.  Bright red blood in your underwear.  Abdominal (lower belly) or stomach pain.  For first baby: Contractions (tightening) less than 5 minutes apart for one hour or more.  Second (plus) baby: Contractions (tightening) less than 10 minutes apart and getting stronger.  *If less than 34 weeks: Contractions (tightenings) more than 6 times in one hour.  Increase or change in vaginal discharge (note the color and amount)      Follow-up:  As scheduled in the clinic

## 2018-11-27 NOTE — PROVIDER NOTIFICATION
11/27/18 1621   Provider Notification   Provider Name/Title Dr Gautam   Method of Notification In Department   Request Evaluate - Remote     MD in department. Reviewed labs and FHR. Updated that pt is noticing a few more contractions but they palpate mild. Orders to discharge pt to home. Instruct pt to monitor contractions at home and follow up if she notices them increasing in intensity and frequency. Will discuss signs and symptoms of preeclampsia with pt at discharge. Orders to follow up in clinic as scheduled.

## 2018-11-27 NOTE — IP AVS SNAPSHOT
[facility] Labor and Delivery    [address]    [facility] [address]    Phone:  [phone]    Fax:  [phone]                                       After Visit Summary   11/27/2018    Sylvia Leonard    MRN: [id number]           After Visit Summary Signature Page     I have received my discharge instructions, and my questions have been answered. I have discussed any challenges I see with this plan with the nurse or doctor.    ..........................................................................................................................................  Patient/Patient Representative Signature      ..........................................................................................................................................  Patient Representative Print Name and Relationship to Patient    ..................................................               ................................................  Date                                   Time    ..........................................................................................................................................  Reviewed by Signature/Title    ...................................................              ..............................................  Date                                               Time          22EPIC Rev 08/18

## 2018-11-27 NOTE — PLAN OF CARE
Data: Patient assessed in the [facility]place for elevated blood pressures.  Cervical exam not examined.  Membranes intact.  Contractions irregular and palpate mild if able to palpate at all.  Action:  Presumed adequate fetal oxygenation documented (see flow record). Discharge instructions reviewed.  Patient instructed to report change in fetal movement, vaginal leaking of fluid or bleeding, abdominal pain, or any concerns related to the pregnancy to her nurse/physician.    Response: Orders to discharge home per Duc Gautam.  Patient verbalized understanding of education and verbalized agreement with plan. Discharged to home at 1630.

## 2018-11-27 NOTE — IP AVS SNAPSHOT
MRN:[id number]                      After Visit Summary   11/27/2018    Sylvia Leonard    MRN: [id number]           Thank you!     Thank you for choosing [facility] for your care. Our goal is always to provide you with excellent care. Hearing back from our patients is one way we can continue to improve our services. Please take a few minutes to complete the written survey that you may receive in the mail after you visit. If you would like to speak to someone directly about your visit please contact Patient Relations at [phone]. Thank you!          Patient Information     Date Of Birth          1995        About your hospital stay     You were admitted on:  November 27, 2018 You last received care in the:  [facility] Labor and Delivery    You were discharged on:  November 27, 2018       Who to Call     For medical emergencies, please call 911.  For non-urgent questions about your medical care, please call your primary care provider or clinic, None          Attending Provider     Provider Specialty    Duc Gautam MD OB/Gyn       Primary Care Provider Fax #    Physician No Ref-Primary [phone]      Your next 10 appointments already scheduled     Dec 04, 2018 10:30 AM CST   ESTABLISHED PRENATAL with Amada Krause MD   [facility]    [address]  [address] [place] [id number]   [phone]            Dec 13, 2018  1:45 PM CST   ESTABLISHED PRENATAL with Amada Krause MD   [facility]    [address]  [address] [place] [address]   [phone]            Dec 21, 2018 10:00 AM CST   ESTABLISHED PRENATAL with Amada Krause MD   [facility]    [address]  [address] [place] [address]   [phone]              Further instructions from your care team       Discharge  Instruction for Undelivered Patients      You were seen for: Blood Pressure evaluation  We Consulted: Dr Gautam  You had (Test or Medicine):Fetal monitoring, labwork     Diet:   Drink 8 to 12 glasses of liquids (milk, juice, water) every day.  You may eat meals and snacks.     Activity:  Call your doctor or nurse midwife if your baby is moving less than usual.     Call your provider if you notice:  Swelling in your face or increased swelling in your hands or legs.  Headaches that are not relieved by Tylenol (acetaminophen).  Changes in your vision (blurring: seeing spots or stars.)  Nausea (sick to your stomach) and vomiting (throwing up).   Weight gain of 5 pounds or more per week.  Heartburn that doesn't go away.  Signs of bladder infection: pain when you urinate (use the toilet), need to go more often and more urgently.  The bag of jang (rupture of membranes) breaks, or you notice leaking in your underwear.  Bright red blood in your underwear.  Abdominal (lower belly) or stomach pain.  For first baby: Contractions (tightening) less than 5 minutes apart for one hour or more.  Second (plus) baby: Contractions (tightening) less than 10 minutes apart and getting stronger.  *If less than 34 weeks: Contractions (tightenings) more than 6 times in one hour.  Increase or change in vaginal discharge (note the color and amount)      Follow-up:  As scheduled in the clinic          Pending Results     No orders found from 11/25/2018 to 11/28/2018.            Admission Information     Date & Time Provider Department Dept. Phone    11/27/2018 Duc Gautam MD [facility] Labor and Delivery [phone]      Your Vitals Were     Blood Pressure Temperature Respirations Last Period          124/77 98.7  F (37.1  C) 16 03/19/2018 (Exact Date)        [employer] [facility] Information     [employer] gives you secure access to your electronic health record. If you see a primary care provider, you can also send messages to your care  team and make appointments. If you have questions, please call your primary care clinic.  If you do not have a primary care provider, please call [phone] and they will assist you.        Care EveryWhere ID     This is your Care EveryWhere ID. This could be used by other organizations to access your [place] medical records  [id number]        Equal Access to Services     CELIA KHAN : Hadii lorenzo sargent hadasho Soomaali, waaxda luqadaha, qaybta kaalmada getachewda, milagros ramiresleonidorly cabrera . So [facility] [phone].    ATENCIÓN: Si habla español, tiene a arzate disposición servicios gratuitos de asistencia lingüística. Llame al [phone].    We comply with applicable federal civil rights laws and [place] laws. We do not discriminate on the basis of race, color, national origin, age, disability, sex, sexual orientation, or gender identity.               Review of your medicines      UNREVIEWED medicines. Ask your doctor about these medicines        Dose / Directions    pediatric multivitamin w/iron 27-0.8 MG tablet        Dose:  1 tablet   Take 1 tablet by mouth daily   Quantity:  100 tablet   Refills:  3       PRILOSEC OTC PO        Dose:  20 mg   Take 20 mg by mouth daily   Refills:  0         CONTINUE these medicines which have NOT CHANGED        Dose / Directions    breast pump Misc   Used for:  Supervision of other normal pregnancy, antepartum        Dose:  1 each   1 each as needed   Quantity:  1 each   Refills:  0                Protect others around you: Learn how to safely use, store and throw away your medicines at www.disposemymeds.org.             Medication List: This is a list of all your medications and when to take them. Check marks below indicate your daily home schedule. Keep this list as a reference.      Medications           Morning Afternoon Evening Bedtime As Needed    breast pump Misc   1 each as needed                                pediatric multivitamin w/iron 27-0.8 MG tablet    Take 1 tablet by mouth daily                                PRILOSEC OTC PO   Take 20 mg by mouth daily

## 2018-11-27 NOTE — PLAN OF CARE
Data: [facility] presented to [facility]: 2018  3:06 PM.  Reason for maternal/fetal assessment is elevated blood pressures. Patient was sent over from clinic for BP and PIH evaluation.  Patient is a .  Prenatal record reviewed. Pregnancy has been uncomplicated..  Gestational Age 36w1d. VSS. Fetal movement present. Patient denies uterine contractions, leaking of vaginal fluid/rupture of membranes, abdominal pain, pelvic pressure, nausea, vomiting, headache, visual disturbances, epigastric or URQ pain, significant edema. Support person is present.   Action: Verbal consent for EFM. Triage assessment completed. Bill of rights reviewed.  Response: Patient verbalized agreement with plan. Will contact Dr Duc Gautam with update and further orders.

## 2018-11-29 LAB
GP B STREP DNA SPEC QL NAA+PROBE: NEGATIVE
SPECIMEN SOURCE: NORMAL

## 2018-12-04 ENCOUNTER — HOSPITAL ENCOUNTER (INPATIENT)
Facility: CLINIC | Age: 23
LOS: 2 days | Discharge: HOME HEALTH | End: 2018-12-06
Attending: OBSTETRICS & GYNECOLOGY | Admitting: OBSTETRICS & GYNECOLOGY
Payer: COMMERCIAL

## 2018-12-04 ENCOUNTER — ANESTHESIA (OUTPATIENT)
Dept: OBGYN | Facility: CLINIC | Age: 23
End: 2018-12-04
Payer: COMMERCIAL

## 2018-12-04 ENCOUNTER — ANESTHESIA EVENT (OUTPATIENT)
Dept: OBGYN | Facility: CLINIC | Age: 23
End: 2018-12-04
Payer: COMMERCIAL

## 2018-12-04 ENCOUNTER — PRENATAL OFFICE VISIT (OUTPATIENT)
Dept: OBGYN | Facility: CLINIC | Age: 23
End: 2018-12-04
Payer: COMMERCIAL

## 2018-12-04 VITALS — DIASTOLIC BLOOD PRESSURE: 90 MMHG | BODY MASS INDEX: 28.67 KG/M2 | SYSTOLIC BLOOD PRESSURE: 140 MMHG | WEIGHT: 167 LBS

## 2018-12-04 DIAGNOSIS — O13.3 GESTATIONAL HYPERTENSION, THIRD TRIMESTER: Primary | ICD-10-CM

## 2018-12-04 LAB
ABO + RH BLD: NORMAL
ABO + RH BLD: NORMAL
ALBUMIN SERPL-MCNC: 2.8 G/DL (ref 3.4–5)
ALP SERPL-CCNC: 158 U/L (ref 40–150)
ALT SERPL W P-5'-P-CCNC: 23 U/L (ref 0–50)
ANION GAP SERPL CALCULATED.3IONS-SCNC: 8 MMOL/L (ref 3–14)
AST SERPL W P-5'-P-CCNC: 19 U/L (ref 0–45)
BASOPHILS # BLD AUTO: 0 10E9/L (ref 0–0.2)
BASOPHILS NFR BLD AUTO: 0.2 %
BILIRUB SERPL-MCNC: 0.3 MG/DL (ref 0.2–1.3)
BUN SERPL-MCNC: 7 MG/DL (ref 7–30)
CALCIUM SERPL-MCNC: 8.6 MG/DL (ref 8.5–10.1)
CHLORIDE SERPL-SCNC: 107 MMOL/L (ref 94–109)
CO2 SERPL-SCNC: 21 MMOL/L (ref 20–32)
CREAT SERPL-MCNC: 0.79 MG/DL (ref 0.52–1.04)
CREAT UR-MCNC: 72 MG/DL
DIFFERENTIAL METHOD BLD: ABNORMAL
EOSINOPHIL # BLD AUTO: 0 10E9/L (ref 0–0.7)
EOSINOPHIL NFR BLD AUTO: 0.2 %
ERYTHROCYTE [DISTWIDTH] IN BLOOD BY AUTOMATED COUNT: 12.3 % (ref 10–15)
GFR SERPL CREATININE-BSD FRML MDRD: 88 ML/MIN/1.7M2
GLUCOSE SERPL-MCNC: 77 MG/DL (ref 70–99)
HCT VFR BLD AUTO: 33.6 % (ref 35–47)
HGB BLD-MCNC: 11.3 G/DL (ref 11.7–15.7)
IMM GRANULOCYTES # BLD: 0.1 10E9/L (ref 0–0.4)
IMM GRANULOCYTES NFR BLD: 0.8 %
LYMPHOCYTES # BLD AUTO: 1 10E9/L (ref 0.8–5.3)
LYMPHOCYTES NFR BLD AUTO: 11.2 %
MCH RBC QN AUTO: 28.4 PG (ref 26.5–33)
MCHC RBC AUTO-ENTMCNC: 33.6 G/DL (ref 31.5–36.5)
MCV RBC AUTO: 84 FL (ref 78–100)
MONOCYTES # BLD AUTO: 0.4 10E9/L (ref 0–1.3)
MONOCYTES NFR BLD AUTO: 3.9 %
NEUTROPHILS # BLD AUTO: 7.6 10E9/L (ref 1.6–8.3)
NEUTROPHILS NFR BLD AUTO: 83.7 %
NRBC # BLD AUTO: 0 10*3/UL
NRBC BLD AUTO-RTO: 0 /100
PLATELET # BLD AUTO: 208 10E9/L (ref 150–450)
POTASSIUM SERPL-SCNC: 3.8 MMOL/L (ref 3.4–5.3)
PROT SERPL-MCNC: 6.9 G/DL (ref 6.8–8.8)
PROT UR-MCNC: 0.33 G/L
PROT/CREAT 24H UR: 0.46 G/G CR (ref 0–0.2)
RBC # BLD AUTO: 3.98 10E12/L (ref 3.8–5.2)
SODIUM SERPL-SCNC: 136 MMOL/L (ref 133–144)
SPECIMEN EXP DATE BLD: NORMAL
WBC # BLD AUTO: 9 10E9/L (ref 4–11)

## 2018-12-04 PROCEDURE — 40000671 ZZH STATISTIC ANESTHESIA CASE

## 2018-12-04 PROCEDURE — 27110038 ZZH RX 271

## 2018-12-04 PROCEDURE — 25000125 ZZHC RX 250: Performed by: OBSTETRICS & GYNECOLOGY

## 2018-12-04 PROCEDURE — 00HU33Z INSERTION OF INFUSION DEVICE INTO SPINAL CANAL, PERCUTANEOUS APPROACH: ICD-10-PCS | Performed by: ANESTHESIOLOGY

## 2018-12-04 PROCEDURE — 25000132 ZZH RX MED GY IP 250 OP 250 PS 637: Performed by: OBSTETRICS & GYNECOLOGY

## 2018-12-04 PROCEDURE — 80053 COMPREHEN METABOLIC PANEL: CPT | Performed by: OBSTETRICS & GYNECOLOGY

## 2018-12-04 PROCEDURE — 12000029 ZZH R&B OB INTERMEDIATE

## 2018-12-04 PROCEDURE — 86780 TREPONEMA PALLIDUM: CPT | Performed by: OBSTETRICS & GYNECOLOGY

## 2018-12-04 PROCEDURE — 84156 ASSAY OF PROTEIN URINE: CPT | Performed by: OBSTETRICS & GYNECOLOGY

## 2018-12-04 PROCEDURE — 86901 BLOOD TYPING SEROLOGIC RH(D): CPT | Performed by: OBSTETRICS & GYNECOLOGY

## 2018-12-04 PROCEDURE — 25000128 H RX IP 250 OP 636

## 2018-12-04 PROCEDURE — 25000128 H RX IP 250 OP 636: Performed by: ANESTHESIOLOGY

## 2018-12-04 PROCEDURE — 37000011 ZZH ANESTHESIA WARD SERVICE

## 2018-12-04 PROCEDURE — 85025 COMPLETE CBC W/AUTO DIFF WBC: CPT | Performed by: OBSTETRICS & GYNECOLOGY

## 2018-12-04 PROCEDURE — 99207 ZZC PRENATAL VISIT: CPT | Performed by: OBSTETRICS & GYNECOLOGY

## 2018-12-04 PROCEDURE — 86900 BLOOD TYPING SEROLOGIC ABO: CPT | Performed by: OBSTETRICS & GYNECOLOGY

## 2018-12-04 PROCEDURE — 25000128 H RX IP 250 OP 636: Performed by: OBSTETRICS & GYNECOLOGY

## 2018-12-04 PROCEDURE — 3E0R3BZ INTRODUCTION OF ANESTHETIC AGENT INTO SPINAL CANAL, PERCUTANEOUS APPROACH: ICD-10-PCS | Performed by: ANESTHESIOLOGY

## 2018-12-04 RX ORDER — CARBOPROST TROMETHAMINE 250 UG/ML
250 INJECTION, SOLUTION INTRAMUSCULAR
Status: CANCELLED | OUTPATIENT
Start: 2018-12-04

## 2018-12-04 RX ORDER — METHYLERGONOVINE MALEATE 0.2 MG/ML
200 INJECTION INTRAVENOUS
Status: CANCELLED | OUTPATIENT
Start: 2018-12-04

## 2018-12-04 RX ORDER — CARBOPROST TROMETHAMINE 250 UG/ML
250 INJECTION, SOLUTION INTRAMUSCULAR
Status: DISCONTINUED | OUTPATIENT
Start: 2018-12-04 | End: 2018-12-05 | Stop reason: CLARIF

## 2018-12-04 RX ORDER — IBUPROFEN 800 MG/1
800 TABLET, FILM COATED ORAL
Status: CANCELLED | OUTPATIENT
Start: 2018-12-04

## 2018-12-04 RX ORDER — MAGNESIUM SULFATE HEPTAHYDRATE 500 MG/ML
4 INJECTION, SOLUTION INTRAMUSCULAR; INTRAVENOUS
Status: DISCONTINUED | OUTPATIENT
Start: 2018-12-04 | End: 2018-12-05 | Stop reason: CLARIF

## 2018-12-04 RX ORDER — IBUPROFEN 800 MG/1
800 TABLET, FILM COATED ORAL
Status: COMPLETED | OUTPATIENT
Start: 2018-12-04 | End: 2018-12-05

## 2018-12-04 RX ORDER — FENTANYL CITRATE 50 UG/ML
50-100 INJECTION, SOLUTION INTRAMUSCULAR; INTRAVENOUS
Status: DISCONTINUED | OUTPATIENT
Start: 2018-12-04 | End: 2018-12-05 | Stop reason: CLARIF

## 2018-12-04 RX ORDER — CALCIUM GLUCONATE 94 MG/ML
1 INJECTION, SOLUTION INTRAVENOUS
Status: DISCONTINUED | OUTPATIENT
Start: 2018-12-04 | End: 2018-12-05 | Stop reason: CLARIF

## 2018-12-04 RX ORDER — OXYCODONE AND ACETAMINOPHEN 5; 325 MG/1; MG/1
1 TABLET ORAL
Status: CANCELLED | OUTPATIENT
Start: 2018-12-04

## 2018-12-04 RX ORDER — ACETAMINOPHEN 325 MG/1
650 TABLET ORAL EVERY 4 HOURS PRN
Status: DISCONTINUED | OUTPATIENT
Start: 2018-12-04 | End: 2018-12-05 | Stop reason: CLARIF

## 2018-12-04 RX ORDER — NALBUPHINE HYDROCHLORIDE 10 MG/ML
2.5-5 INJECTION, SOLUTION INTRAMUSCULAR; INTRAVENOUS; SUBCUTANEOUS EVERY 6 HOURS PRN
Status: DISCONTINUED | OUTPATIENT
Start: 2018-12-04 | End: 2018-12-05 | Stop reason: CLARIF

## 2018-12-04 RX ORDER — BUPIVACAINE HYDROCHLORIDE 2.5 MG/ML
INJECTION, SOLUTION INFILTRATION; PERINEURAL PRN
Status: DISCONTINUED | OUTPATIENT
Start: 2018-12-04 | End: 2018-12-04

## 2018-12-04 RX ORDER — MAGNESIUM SULFATE IN WATER 40 MG/ML
2 INJECTION, SOLUTION INTRAVENOUS CONTINUOUS
Status: DISCONTINUED | OUTPATIENT
Start: 2018-12-04 | End: 2018-12-05 | Stop reason: CLARIF

## 2018-12-04 RX ORDER — OXYTOCIN/0.9 % SODIUM CHLORIDE 30/500 ML
1-24 PLASTIC BAG, INJECTION (ML) INTRAVENOUS CONTINUOUS
Status: DISCONTINUED | OUTPATIENT
Start: 2018-12-04 | End: 2018-12-05 | Stop reason: CLARIF

## 2018-12-04 RX ORDER — EPHEDRINE SULFATE 50 MG/ML
5 INJECTION, SOLUTION INTRAMUSCULAR; INTRAVENOUS; SUBCUTANEOUS
Status: DISCONTINUED | OUTPATIENT
Start: 2018-12-04 | End: 2018-12-05 | Stop reason: CLARIF

## 2018-12-04 RX ORDER — OXYTOCIN 10 [USP'U]/ML
10 INJECTION, SOLUTION INTRAMUSCULAR; INTRAVENOUS
Status: CANCELLED | OUTPATIENT
Start: 2018-12-04

## 2018-12-04 RX ORDER — BUPIVACAINE HCL/0.9 % NACL/PF 0.125 %
PLASTIC BAG, INJECTION (ML) EPIDURAL
Status: COMPLETED
Start: 2018-12-04 | End: 2018-12-04

## 2018-12-04 RX ORDER — SODIUM CHLORIDE, SODIUM LACTATE, POTASSIUM CHLORIDE, CALCIUM CHLORIDE 600; 310; 30; 20 MG/100ML; MG/100ML; MG/100ML; MG/100ML
INJECTION, SOLUTION INTRAVENOUS CONTINUOUS
Status: DISCONTINUED | OUTPATIENT
Start: 2018-12-04 | End: 2018-12-05 | Stop reason: CLARIF

## 2018-12-04 RX ORDER — NALOXONE HYDROCHLORIDE 0.4 MG/ML
.1-.4 INJECTION, SOLUTION INTRAMUSCULAR; INTRAVENOUS; SUBCUTANEOUS
Status: DISCONTINUED | OUTPATIENT
Start: 2018-12-04 | End: 2018-12-05 | Stop reason: CLARIF

## 2018-12-04 RX ORDER — EPHEDRINE SULFATE 50 MG/ML
INJECTION, SOLUTION INTRAMUSCULAR; INTRAVENOUS; SUBCUTANEOUS
Status: DISCONTINUED
Start: 2018-12-04 | End: 2018-12-05 | Stop reason: HOSPADM

## 2018-12-04 RX ORDER — OXYTOCIN 10 [USP'U]/ML
10 INJECTION, SOLUTION INTRAMUSCULAR; INTRAVENOUS
Status: DISCONTINUED | OUTPATIENT
Start: 2018-12-04 | End: 2018-12-05 | Stop reason: CLARIF

## 2018-12-04 RX ORDER — LORAZEPAM 2 MG/ML
2 INJECTION INTRAMUSCULAR
Status: DISCONTINUED | OUTPATIENT
Start: 2018-12-04 | End: 2018-12-05 | Stop reason: CLARIF

## 2018-12-04 RX ORDER — NIFEDIPINE 10 MG/1
10 CAPSULE ORAL
Status: DISCONTINUED | OUTPATIENT
Start: 2018-12-04 | End: 2018-12-05 | Stop reason: CLARIF

## 2018-12-04 RX ORDER — OXYTOCIN/0.9 % SODIUM CHLORIDE 30/500 ML
100-340 PLASTIC BAG, INJECTION (ML) INTRAVENOUS CONTINUOUS PRN
Status: CANCELLED | OUTPATIENT
Start: 2018-12-04

## 2018-12-04 RX ORDER — NALOXONE HYDROCHLORIDE 0.4 MG/ML
.1-.4 INJECTION, SOLUTION INTRAMUSCULAR; INTRAVENOUS; SUBCUTANEOUS
Status: CANCELLED | OUTPATIENT
Start: 2018-12-04

## 2018-12-04 RX ORDER — ACETAMINOPHEN 325 MG/1
650 TABLET ORAL EVERY 4 HOURS PRN
Status: CANCELLED | OUTPATIENT
Start: 2018-12-04

## 2018-12-04 RX ORDER — OXYTOCIN/0.9 % SODIUM CHLORIDE 30/500 ML
100-340 PLASTIC BAG, INJECTION (ML) INTRAVENOUS CONTINUOUS PRN
Status: DISCONTINUED | OUTPATIENT
Start: 2018-12-04 | End: 2018-12-05 | Stop reason: CLARIF

## 2018-12-04 RX ORDER — BUPIVACAINE HCL/0.9 % NACL/PF 0.125 %
15 PLASTIC BAG, INJECTION (ML) EPIDURAL CONTINUOUS
Status: DISCONTINUED | OUTPATIENT
Start: 2018-12-04 | End: 2018-12-05 | Stop reason: CLARIF

## 2018-12-04 RX ORDER — METHYLERGONOVINE MALEATE 0.2 MG/ML
200 INJECTION INTRAVENOUS
Status: DISCONTINUED | OUTPATIENT
Start: 2018-12-04 | End: 2018-12-05 | Stop reason: CLARIF

## 2018-12-04 RX ORDER — ONDANSETRON 2 MG/ML
4 INJECTION INTRAMUSCULAR; INTRAVENOUS EVERY 6 HOURS PRN
Status: DISCONTINUED | OUTPATIENT
Start: 2018-12-04 | End: 2018-12-05 | Stop reason: CLARIF

## 2018-12-04 RX ORDER — ONDANSETRON 2 MG/ML
4 INJECTION INTRAMUSCULAR; INTRAVENOUS EVERY 6 HOURS PRN
Status: CANCELLED | OUTPATIENT
Start: 2018-12-04

## 2018-12-04 RX ORDER — OXYCODONE AND ACETAMINOPHEN 5; 325 MG/1; MG/1
1 TABLET ORAL
Status: DISCONTINUED | OUTPATIENT
Start: 2018-12-04 | End: 2018-12-05 | Stop reason: CLARIF

## 2018-12-04 RX ORDER — MAGNESIUM SULFATE HEPTAHYDRATE 40 MG/ML
4 INJECTION, SOLUTION INTRAVENOUS ONCE
Status: DISCONTINUED | OUTPATIENT
Start: 2018-12-04 | End: 2018-12-05 | Stop reason: CLARIF

## 2018-12-04 RX ORDER — MAGNESIUM SULFATE HEPTAHYDRATE 40 MG/ML
4 INJECTION, SOLUTION INTRAVENOUS
Status: DISCONTINUED | OUTPATIENT
Start: 2018-12-04 | End: 2018-12-05 | Stop reason: CLARIF

## 2018-12-04 RX ORDER — SODIUM CHLORIDE, SODIUM LACTATE, POTASSIUM CHLORIDE, CALCIUM CHLORIDE 600; 310; 30; 20 MG/100ML; MG/100ML; MG/100ML; MG/100ML
INJECTION, SOLUTION INTRAVENOUS CONTINUOUS
Status: CANCELLED | OUTPATIENT
Start: 2018-12-04

## 2018-12-04 RX ORDER — SODIUM CHLORIDE, SODIUM LACTATE, POTASSIUM CHLORIDE, CALCIUM CHLORIDE 600; 310; 30; 20 MG/100ML; MG/100ML; MG/100ML; MG/100ML
10-125 INJECTION, SOLUTION INTRAVENOUS CONTINUOUS
Status: DISCONTINUED | OUTPATIENT
Start: 2018-12-04 | End: 2018-12-05 | Stop reason: CLARIF

## 2018-12-04 RX ORDER — LIDOCAINE 40 MG/G
CREAM TOPICAL
Status: DISCONTINUED | OUTPATIENT
Start: 2018-12-04 | End: 2018-12-05 | Stop reason: CLARIF

## 2018-12-04 RX ADMIN — FENTANYL CITRATE 100 MCG: 50 INJECTION, SOLUTION INTRAMUSCULAR; INTRAVENOUS at 18:42

## 2018-12-04 RX ADMIN — FENTANYL CITRATE 100 MCG: 50 INJECTION, SOLUTION INTRAMUSCULAR; INTRAVENOUS at 19:42

## 2018-12-04 RX ADMIN — BUPIVACAINE HYDROCHLORIDE 15 ML: 2.5 INJECTION, SOLUTION INFILTRATION; PERINEURAL at 20:07

## 2018-12-04 RX ADMIN — SODIUM CHLORIDE, POTASSIUM CHLORIDE, SODIUM LACTATE AND CALCIUM CHLORIDE: 600; 310; 30; 20 INJECTION, SOLUTION INTRAVENOUS at 14:23

## 2018-12-04 RX ADMIN — OXYTOCIN-SODIUM CHLORIDE 0.9% IV SOLN 30 UNIT/500ML 2 MILLI-UNITS/MIN: 30-0.9/5 SOLUTION at 14:23

## 2018-12-04 RX ADMIN — Medication: at 20:43

## 2018-12-04 RX ADMIN — OMEPRAZOLE 20 MG: 20 CAPSULE, DELAYED RELEASE ORAL at 14:14

## 2018-12-04 NOTE — PROVIDER NOTIFICATION
12/04/18 1340   Provider Notification   Provider Name/Title Dr. Krause   Method of Notification In Department   Request Evaluate - Remote   Notification Reason Patient Arrived;SVE    updated on SVE, blood pressures and FHT.  Orders received. Will continue to monitor.

## 2018-12-04 NOTE — IP AVS SNAPSHOT
MRN:[id number]                      After Visit Summary   12/4/2018    Sylvia Leonard    MRN: [id number]           Thank you!     Thank you for choosing [facility] for your care. Our goal is always to provide you with excellent care. Hearing back from our patients is one way we can continue to improve our services. Please take a few minutes to complete the written survey that you may receive in the mail after you visit. If you would like to speak to someone directly about your visit please contact Patient Relations at [phone]. Thank you!          Patient Information     Date Of Birth          1995        Designated Caregiver       Most Recent Value    Caregiver    Will someone help with your care after discharge? no      About your hospital stay     You were admitted on:  December 4, 2018 You last received care in the:  [facility] Postpartum    You were discharged on:  December 6, 2018       Who to Call     For medical emergencies, please call 911.  For non-urgent questions about your medical care, please call your primary care provider or clinic, None          Attending Provider     Provider Specialty    Amada Krause MD OB/Gyn       Primary Care Provider Fax #    Physician No Ref-Primary [phone]      Your next 10 appointments already scheduled     Dec 13, 2018  1:45 PM CST   ESTABLISHED PRENATAL with Amada Krause MD   [facility]    [address]  [address] [place] [id number] [phone]            Dec 21, 2018 10:00 AM CST   ESTABLISHED PRENATAL with Amada Krause MD   [facility] ([facility])    [address]  [address] [place] [address]   [phone]              Further instructions from your care team       Please make appointment with nurse for a blood pressure check Monday, December 10th.  Make an appointment with OB provider for 6  week postpartum check.  Postpartum Vaginal Delivery Instructions    Lactation phone number [phone]  [facility] phone number [phone]    Activity       Ask family and friends for help when you need it.    Do not place anything in your vagina for 6 weeks.    You are not restricted on other activities, but take it easy for a few weeks to allow your body to recover from delivery.  You are able to do any activities you feel up to that point.    No driving until you have stopped taking your pain medications (usually two weeks after delivery).     Call your health care provider if you have any of these symptoms:       Increased pain, swelling, redness, or fluid around your stiches from an episiotomy or perineal tear.    A fever above 100.4 F (38 C) with or without chills when placing a thermometer under your tongue.    You soak a sanitary pad with blood within 1 hour, or you see blood clots larger than a golf ball.    Bleeding that lasts more than 6 weeks.    Vaginal discharge that smells bad.    Severe pain, cramping or tenderness in your lower belly area.    A need to urinate more frequently (use the toilet more often), more urgently (use the toilet very quickly), or it burns when you urinate.    Nausea and vomiting.    Redness, swelling or pain around a vein in your leg.    Problems breastfeeding or a red or painful area on your breast.    Chest pain and cough or are gasping for air.    Problems coping with sadness, anxiety, or depression.  If you have any concerns about hurting yourself or the baby, call your provider immediately.     You have questions or concerns after you return home.     Keep your hands clean:  Always wash your hands before touching your perineal area and stitches.  This helps reduce your risk of infection.  If your hands aren't dirty, you may use an alcohol hand-rub to clean your hands. Keep your nails clean and short.        Pending Results     No orders found from 12/2/2018 to  "12/5/2018.            Admission Information     Date & Time Provider Department Dept. Phone    12/4/2018 Amada Krause MD [facility] Postpartum [phone]      Your Vitals Were     Blood Pressure Pulse Temperature Respirations Height Weight    126/65 63 97.4  F (36.3  C) (Oral) 18 1.626 m (5' 4\") 76.2 kg (168 lb)    Last Period BMI (Body Mass Index)                03/19/2018 (Exact Date) 28.84 kg/m2          MyChart Information     [employer] gives you secure access to your electronic health record. If you see a primary care provider, you can also send messages to your care team and make appointments. If you have questions, please call your primary care clinic.  If you do not have a primary care provider, please call [phone] and they will assist you.        Care EveryWhere ID     This is your Care EveryWhere ID. This could be used by other organizations to access your [facility] medical records  [id number]        Equal Access to Services     CELIA KHAN : Hadii lorenzo sargent hadasho Soomaali, waaxda luqadaha, qaybta kaalmada adeegyada, milagros cabrera . So [facility] [phone].    ATENCIÓN: Si habla español, tiene a arzate disposición servicios gratuitos de asistencia lingüística. Llame al [phone].    We comply with applicable federal civil rights laws and [place] laws. We do not discriminate on the basis of race, color, national origin, age, disability, sex, sexual orientation, or gender identity.               Review of your medicines      UNREVIEWED medicines. Ask your doctor about these medicines        Dose / Directions    prenatal multivitamin w/iron 27-0.8 MG tablet        Dose:  1 tablet   Take 1 tablet by mouth daily   Quantity:  100 tablet   Refills:  3       PRILOSEC OTC PO        Dose:  20 mg   Take 20 mg by mouth daily   Refills:  0         CONTINUE these medicines which have NOT CHANGED        Dose / Directions    breast pump Misc   Used for:  Supervision of other normal " pregnancy, antepartum        Dose:  1 each   1 each as needed   Quantity:  1 each   Refills:  0                Protect others around you: Learn how to safely use, store and throw away your medicines at www.disposemymeds.org.             Medication List: This is a list of all your medications and when to take them. Check marks below indicate your daily home schedule. Keep this list as a reference.      Medications           Morning Afternoon Evening Bedtime As Needed    breast pump Misc   1 each as needed                                prenatal multivitamin w/iron 27-0.8 MG tablet   Take 1 tablet by mouth daily                                PRILOSEC OTC PO   Take 20 mg by mouth daily

## 2018-12-04 NOTE — PROVIDER NOTIFICATION
12/04/18 1637   Provider Notification   Provider Name/Title Dr. Krause   Method of Notification At Bedside   Request Evaluate in Person   Notification Reason Membrane Status        12/04/18 1637   Provider Notification   Provider Name/Title Dr. Krause   Method of Notification At Bedside   Request Evaluate in Person   Notification Reason Membrane Status        12/04/18 1637   Provider Notification   Provider Name/Title Dr. Krause   Method of Notification At Bedside   Request Evaluate in Person   Notification Reason Membrane Status

## 2018-12-04 NOTE — MR AVS SNAPSHOT
After Visit Summary   12/4/2018    Sylvia Leonard    MRN: [id number]           Patient Information     Date Of Birth          1995        Visit Information        Provider Department      12/4/2018 10:30 AM Amada Krause MD [facility]        Today's Diagnoses     Gestational hypertension, third trimester    -  1       Follow-ups after your visit        Your next 10 appointments already scheduled     Dec 13, 2018  1:45 PM CST   ESTABLISHED PRENATAL with Amada Krause MD   [facility] ([facility]    [address]  [address]  [facility] [id number] [phone]            Dec 21, 2018 10:00 AM CST   ESTABLISHED PRENATAL with Amada Krause MD   [facility] ([facility])    [address]  [address] [place] [id number] [phone]              Who to contact     If you have questions or need follow up information about today's clinic visit or your schedule please contact [facility] directly at [phone].  Normal or non-critical lab and imaging results will be communicated to you by MyChart, letter or phone within 4 business days after the clinic has received the results. If you do not hear from us within 7 days, please contact the clinic through [facility] or phone. If you have a critical or abnormal lab result, we will notify you by phone as soon as possible.  Submit refill requests through [employer] or call your pharmacy and they will forward the refill request to us. Please allow 3 business days for your refill to be completed.          Additional Information About Your Visit        MyChart Information     [employer] gives you secure access to your electronic health record. If you see a primary care provider, you can also send messages to your care team and make appointments. If you have questions, please call your primary care clinic.  If you do not have a primary care  provider, please call [phone] and they will assist you.        Care EveryWhere ID     This is your Care EveryWhere ID. This could be used by other organizations to access your [facility] medical records  [id number]        Your Vitals Were     Last Period Breastfeeding? BMI (Body Mass Index)             03/19/2018 (Exact Date) No 28.67 kg/m2          Blood Pressure from Last 3 Encounters:   12/04/18 140/86   12/04/18 140/90   11/27/18 124/77    Weight from Last 3 Encounters:   12/04/18 168 lb (76.2 kg)   12/04/18 167 lb (75.8 kg)   11/27/18 167 lb (75.8 kg)              Today, you had the following     No orders found for display       Primary Care Provider Fax #    Physician No Ref-Primary [phone]       No address on file        Equal Access to Services     CELIA KHAN : Hadturner Hines, waizzy rodriguez, raymond kaalalejandra medina, milagros cabrera . So [facility] [phone].    ATENCIÓN: Si habla español, tiene a arzate disposición servicios gratuitos de asistencia lingüística. Arletame al [phone].    We comply with applicable federal civil rights laws and [place] laws. We do not discriminate on the basis of race, color, national origin, age, disability, sex, sexual orientation, or gender identity.            Thank you!     Thank you for choosing [facility]  for your care. Our goal is always to provide you with excellent care. Hearing back from our patients is one way we can continue to improve our services. Please take a few minutes to complete the written survey that you may receive in the mail after your visit with us. Thank you!             Your Updated Medication List - Protect others around you: Learn how to safely use, store and throw away your medicines at www.disposemymeds.org.          This list is accurate as of 12/4/18 12:25 PM.  Always use your most recent med list.                   Brand Name Dispense Instructions for use Diagnosis     breast pump Misc     1 each    1 each as needed    Supervision of other normal pregnancy, antepartum       prenatal multivitamin w/iron 27-0.8 MG tablet     100 tablet    Take 1 tablet by mouth daily        PRILOSEC OTC PO      Take 20 mg by mouth daily

## 2018-12-04 NOTE — IP AVS SNAPSHOT
[facility]    [address]    [facility] [address]    Phone:  [phone]    Fax:  [phone]                                       After Visit Summary   12/4/2018    Sylvia Leonard    MRN: [id number]           After Visit Summary Signature Page     I have received my discharge instructions, and my questions have been answered. I have discussed any challenges I see with this plan with the nurse or doctor.    ..........................................................................................................................................  Patient/Patient Representative Signature      ..........................................................................................................................................  Patient Representative Print Name and Relationship to Patient    ..................................................               ................................................  Date                                   Time    ..........................................................................................................................................  Reviewed by Signature/Title    ...................................................              ..............................................  Date                                               Time          22EPIC Rev 08/18

## 2018-12-04 NOTE — NURSING NOTE
"Chief Complaint   Patient presents with     Prenatal Care       Initial /90  Wt 167 lb (75.8 kg)  LMP 2018 (Exact Date)  Breastfeeding? No  BMI 28.67 kg/m2 Estimated body mass index is 28.67 kg/(m^2) as calculated from the following:    Height as of 18: 5' 4\" (1.626 m).    Weight as of this encounter: 167 lb (75.8 kg).  BP completed using cuff size: regular    Questioned patient about current smoking habits.  Pt. has never smoked.          37w1d  + FM daily  + desmond barth contractions  - bleeding  + cramping  + loose stools last NOC  - heartburn  + edema   - headache  Zehra Jain LPN               "

## 2018-12-04 NOTE — H&P
[facility]    History and Physical  Obstetrics and Gynecology     Date of Admission:  2018    Assessment & Plan   Sylvia Leonard is a 23 year old female who presents with preeclampsia without severe features for IOL.    ASSESSMENT:   IUP @ 37w1d for IOL for preeclampsia without severe features  NST reactive.  Category  I    PLAN:   Admit - see IP orders  MD consultant on call Jimi/ available prn  Labor induction with Pitocin  Labs within normal limits. Close follow up of blood pressure, plan to treat with IV labetalol if sustained BPs in severe range; would also start magnesium prophylaxis at that time.  Not sure on plan for pain meds, if needed, but aware of and open to all options.  All questions answered.    Amada Krause MD    History of Present Illness   Sylvia Leonard is a 23 year old female  37w1d  Estimated Date of Delivery: 18 is calculated from Patient's last menstrual period was 2018 (exact date). [facility]  for [facility] of labor.  Indication preeclampsia without severe features.    PRENATAL COURSE  Prenatal course was essentially uncomplicated until this diagnosis.    Recent Labs   Lab Test  18   1345  05/10/18   0940   ABO  A  A   RH  Pos  Pos   AS   --   Neg     Rhogam not indicated   Recent Labs   Lab Test  18   1500  05/10/18   0939   HEPBANG   --   Nonreactive   HIAGAB   --   Nonreactive   GBS  Negative   --    RUQIGG   --   26       Past Medical History    I have reviewed this patient's medical history and updated it with pertinent information if needed.   Past Medical History:   Diagnosis Date     Hypertension      Migraine        Past Surgical History   I have reviewed this patient's surgical history and updated it with pertinent information if needed.  Past Surgical History:   Procedure Laterality Date     NO HISTORY OF SURGERY       wisdom teeth         Prior to Admission Medications   Prior to Admission Medications    Prescriptions Last Dose Informant Patient Reported? Taking?   Misc. Devices (BREAST PUMP) MISC   No No   Si each as needed   Omeprazole Magnesium (PRILOSEC OTC PO) 12/3/2018 at Unknown time  Yes Yes   Sig: Take 20 mg by mouth daily   Prenatal Vit-Fe Fumarate-FA (PRENATAL MULTIVITAMIN PLUS IRON) 27-0.8 MG TABS per tablet 2018 at Unknown time  Yes Yes   Sig: Take 1 tablet by mouth daily      Facility-Administered Medications: None     Allergies   No Known Allergies    Social History   I have reviewed this patient's social history and updated it with pertinent information if needed. Sylvia Leonard  reports that she has never smoked. She has never used smokeless tobacco. She reports that she does not drink alcohol or use illicit drugs.    Family History   I have reviewed this patient's family history and updated it with pertinent information if needed.   Family History   Problem Relation Age of Onset     Hypertension Mother      Hyperlipidemia Mother      Cerebrovascular Disease Maternal Grandmother        Immunization History   Immunizations are up to date    Physical Exam   Temp: (P) 97.6  F (36.4  C) Temp src: (P) Oral BP: 140/86 Pulse: 83   Resp: (P) 18        Vital Signs with Ranges  Temp:  [97.6  F (36.4  C)-98.2  F (36.8  C)] (P) 97.6  F (36.4  C)  Pulse:  [83] 83  Resp:  [18] (P) 18  BP: (140-149)/(69-90) 140/86    Abdomen: gravid, single vertex fetus, non-tender, EFW 7 lbs 0. No RUQ pain.  Cervical Exam: 2.5/ 70/ Posterior/ soft/ -2     Fetal Heart Tones: nl baseline, moderate variablility, + accels, no decels and Category I  TOCO:   frequency q 2-5 minutes    Constitutional: healthy, alert and active   Respiratory: No increased work of breathing, good air exchange, clear to auscultation bilaterally, no crackles or wheezing  Cardiovascular: Normal apical impulse, regular rate and rhythm, normal S1 and S2, no S3 or S4, and no murmur noted  Skin/Extremites: normal skin color, texture, turgor  Neurologic:  Deep Tendon Reflexes:  Reflexes are intact and symmetrical bilaterally, brisk  One beat of clonus bilaterally.  Neuropsychiatric: Affect: normal and pleasant  Orientation: oriented to self, place, time and situation

## 2018-12-04 NOTE — PLAN OF CARE
Data: [facility] presented [facility]: 2018 12:25 PM.  Reason for maternal/fetal assessment is induction of labor for gestational hypertension.   Patient is a .  Prenatal record reviewed. Pregnancy  has been complicated by gestational hypertension.  Gestational Age 37w1d. VSS. Fetal movement present. Patient denies uterine contractions, leaking of vaginal fluid/rupture of membranes, vaginal bleeding, abdominal pain, pelvic pressure, nausea, vomiting, headache, visual disturbances, epigastric or URQ pain, significant edema. Support person is present.   Action: Verbal consent for EFM. Triage assessment completed. Bill of rights reviewed.  Response: Patient verbalized agreement with plan. Will contact Dr Amada Krause with update and further orders.

## 2018-12-05 LAB — T PALLIDUM AB SER QL: NONREACTIVE

## 2018-12-05 PROCEDURE — 25000132 ZZH RX MED GY IP 250 OP 250 PS 637: Performed by: OBSTETRICS & GYNECOLOGY

## 2018-12-05 PROCEDURE — 12000029 ZZH R&B OB INTERMEDIATE

## 2018-12-05 PROCEDURE — 0HQ9XZZ REPAIR PERINEUM SKIN, EXTERNAL APPROACH: ICD-10-PCS | Performed by: OBSTETRICS & GYNECOLOGY

## 2018-12-05 PROCEDURE — 72200001 ZZH LABOR CARE VAGINAL DELIVERY SINGLE

## 2018-12-05 PROCEDURE — 25000128 H RX IP 250 OP 636: Performed by: OBSTETRICS & GYNECOLOGY

## 2018-12-05 PROCEDURE — 59400 OBSTETRICAL CARE: CPT | Performed by: OBSTETRICS & GYNECOLOGY

## 2018-12-05 RX ORDER — OXYTOCIN/0.9 % SODIUM CHLORIDE 30/500 ML
340 PLASTIC BAG, INJECTION (ML) INTRAVENOUS CONTINUOUS PRN
Status: DISCONTINUED | OUTPATIENT
Start: 2018-12-05 | End: 2018-12-06 | Stop reason: HOSPADM

## 2018-12-05 RX ORDER — ACETAMINOPHEN 325 MG/1
650 TABLET ORAL EVERY 4 HOURS PRN
Status: DISCONTINUED | OUTPATIENT
Start: 2018-12-05 | End: 2018-12-06 | Stop reason: HOSPADM

## 2018-12-05 RX ORDER — HYDROCORTISONE 2.5 %
CREAM (GRAM) TOPICAL 3 TIMES DAILY PRN
Status: DISCONTINUED | OUTPATIENT
Start: 2018-12-05 | End: 2018-12-06 | Stop reason: HOSPADM

## 2018-12-05 RX ORDER — AMOXICILLIN 250 MG
2 CAPSULE ORAL 2 TIMES DAILY
Status: DISCONTINUED | OUTPATIENT
Start: 2018-12-05 | End: 2018-12-06 | Stop reason: HOSPADM

## 2018-12-05 RX ORDER — AMOXICILLIN 250 MG
1 CAPSULE ORAL 2 TIMES DAILY
Status: DISCONTINUED | OUTPATIENT
Start: 2018-12-05 | End: 2018-12-06 | Stop reason: HOSPADM

## 2018-12-05 RX ORDER — OXYCODONE HYDROCHLORIDE 5 MG/1
5-10 TABLET ORAL
Status: DISCONTINUED | OUTPATIENT
Start: 2018-12-05 | End: 2018-12-06 | Stop reason: HOSPADM

## 2018-12-05 RX ORDER — MISOPROSTOL 200 UG/1
800 TABLET ORAL
Status: DISCONTINUED | OUTPATIENT
Start: 2018-12-05 | End: 2018-12-06 | Stop reason: HOSPADM

## 2018-12-05 RX ORDER — NALOXONE HYDROCHLORIDE 0.4 MG/ML
.1-.4 INJECTION, SOLUTION INTRAMUSCULAR; INTRAVENOUS; SUBCUTANEOUS
Status: DISCONTINUED | OUTPATIENT
Start: 2018-12-05 | End: 2018-12-06 | Stop reason: HOSPADM

## 2018-12-05 RX ORDER — IBUPROFEN 800 MG/1
800 TABLET, FILM COATED ORAL EVERY 6 HOURS PRN
Status: DISCONTINUED | OUTPATIENT
Start: 2018-12-05 | End: 2018-12-06 | Stop reason: HOSPADM

## 2018-12-05 RX ORDER — LANOLIN 100 %
OINTMENT (GRAM) TOPICAL
Status: DISCONTINUED | OUTPATIENT
Start: 2018-12-05 | End: 2018-12-06 | Stop reason: HOSPADM

## 2018-12-05 RX ORDER — BISACODYL 10 MG
10 SUPPOSITORY, RECTAL RECTAL DAILY PRN
Status: DISCONTINUED | OUTPATIENT
Start: 2018-12-07 | End: 2018-12-06 | Stop reason: HOSPADM

## 2018-12-05 RX ORDER — IBUPROFEN 600 MG/1
600 TABLET, FILM COATED ORAL EVERY 6 HOURS PRN
Status: DISCONTINUED | OUTPATIENT
Start: 2018-12-05 | End: 2018-12-05

## 2018-12-05 RX ORDER — OXYTOCIN/0.9 % SODIUM CHLORIDE 30/500 ML
100 PLASTIC BAG, INJECTION (ML) INTRAVENOUS CONTINUOUS
Status: DISCONTINUED | OUTPATIENT
Start: 2018-12-05 | End: 2018-12-06 | Stop reason: HOSPADM

## 2018-12-05 RX ORDER — OXYTOCIN 10 [USP'U]/ML
10 INJECTION, SOLUTION INTRAMUSCULAR; INTRAVENOUS
Status: DISCONTINUED | OUTPATIENT
Start: 2018-12-05 | End: 2018-12-06 | Stop reason: HOSPADM

## 2018-12-05 RX ADMIN — IBUPROFEN 800 MG: 800 TABLET ORAL at 05:27

## 2018-12-05 RX ADMIN — SODIUM CHLORIDE, POTASSIUM CHLORIDE, SODIUM LACTATE AND CALCIUM CHLORIDE: 600; 310; 30; 20 INJECTION, SOLUTION INTRAVENOUS at 00:59

## 2018-12-05 RX ADMIN — SENNOSIDES AND DOCUSATE SODIUM 1 TABLET: 8.6; 5 TABLET ORAL at 09:15

## 2018-12-05 RX ADMIN — OXYCODONE HYDROCHLORIDE 10 MG: 5 TABLET ORAL at 23:34

## 2018-12-05 RX ADMIN — IBUPROFEN 600 MG: 600 TABLET ORAL at 12:26

## 2018-12-05 RX ADMIN — ACETAMINOPHEN 650 MG: 325 TABLET, FILM COATED ORAL at 14:05

## 2018-12-05 RX ADMIN — SENNOSIDES AND DOCUSATE SODIUM 1 TABLET: 8.6; 5 TABLET ORAL at 21:18

## 2018-12-05 RX ADMIN — OXYCODONE HYDROCHLORIDE 10 MG: 5 TABLET ORAL at 19:34

## 2018-12-05 RX ADMIN — ACETAMINOPHEN 650 MG: 325 TABLET, FILM COATED ORAL at 09:15

## 2018-12-05 RX ADMIN — IBUPROFEN 600 MG: 600 TABLET ORAL at 18:28

## 2018-12-05 ASSESSMENT — ACTIVITIES OF DAILY LIVING (ADL)
AMBULATION: 0-->INDEPENDENT
RETIRED_COMMUNICATION: 0-->UNDERSTANDS/COMMUNICATES WITHOUT DIFFICULTY
TRANSFERRING: 0-->INDEPENDENT
COGNITION: 0 - NO COGNITION ISSUES REPORTED
SWALLOWING: 0-->SWALLOWS FOODS/LIQUIDS WITHOUT DIFFICULTY
TOILETING: 0-->INDEPENDENT
BATHING: 0-->INDEPENDENT
RETIRED_EATING: 0-->INDEPENDENT
DRESS: 0-->INDEPENDENT
FALL_HISTORY_WITHIN_LAST_SIX_MONTHS: NO

## 2018-12-05 NOTE — L&D DELIVERY NOTE
VAGINAL DELIVERY PROCEDURE NOTE    PREOPERATIVE DIAGNOSIS:  1. Intrauterine pregnancy at 37w2d  2. Active labor  3. Preeclampsia without severe features    POSTOPERATIVE DIAGNOSIS:   1. Status post   2. First degree laceration    PROCEDURE DATE: 2018    PROCEDURE:   1.   2. Laceration repair      STAFF SURGEON: Amada Krause MD    ANESTHESIA: epidural    COMPLICATIONS: none    QBL: 180 cc.     SPECIMENS: cord blood    FINDINGS:  female infant, Apgar scores of 9 and 9.  Delivered in ALFREDA presentation. Placenta with 3 vessel cord. Meconium: terminal only.      INDICATIONS:  This is a 23 year old  with intrauterine pregnancy at 37w2d who presented to Labor and Delivery for IOL for preeclampsia without severe features. Her pregnancy has been uncomplicated until the development of hypertension in the last week.     PROCEDURE:  The patient was complete and pushing. After two hours of pushing, she was in significant pain and fetus was found to be presenting in the ROP position. Manual rotation to ALFREDA was accomplished after epidural bolus and emptying the bladder, with her permission and after discussion of risks, benefits, and alternatives. Infant's head was delivered atraumatically over perineum in the ALFREDA position. Nuchal cord none. Anterior shoulder and posterior shoulders were easily delivered without problems followed by remainder of infant. Infant vigorous and crying. Drying and resuscitative measures performed. Delayed cord clamping was performed prior to cutting. Cord gases were not obtained. Cord blood was collected. Pitocin in IV fluid was administered per protocol. The placenta delivered spontaneously intact with 3 vessel cord and revealed normal anatomy. Uterine massage was performed and the fundus was found to be firm. Vagina, cervix, and perineum were inspected for lacerations. First degree laceration was noted and repaired with 3.0 vicryl rapide. All counts were correct. Mom and  baby stable in room.      Amada Krause MD  4:55 AM  December 5, 2018

## 2018-12-05 NOTE — PLAN OF CARE
Problem: Patient Care Overview  Goal: Plan of Care/Patient Progress Review  Outcome: Improving  Patient meeting expected goals. VSS. Patient was able to void before transfer. Ice being used for swelling and perineum discomfort. Pain is being managed with Tylenol and Ibuprofen. Breastfeeding had gone well before transfer and writer will remain supportive and assist PRN.  Bonding well with infant. IV SL. Patient is aware to call for assistance and to not get out of bed on own until assessed as safe to do so.

## 2018-12-05 NOTE — PROVIDER NOTIFICATION
12/05/18 0340   Provider Notification   Provider Name/Title Dr. Krause   Method of Notification At Bedside   Request Evaluate in Person   Notification Reason Status Update     Dr. Krause at bedside to attempt rotation from OP position. Evaluating pushing progress.

## 2018-12-05 NOTE — PROVIDER NOTIFICATION
12/04/18 2007   Provider Notification   Provider Name/Title Dr. Lundberg   Method of Notification At Bedside   Request Evaluate in Person   Notification Reason Pain   MDA at bedside for epidural, consent signed and timeout performed.

## 2018-12-05 NOTE — PROVIDER NOTIFICATION
12/05/18 0410   Provider Notification   Provider Name/Title Dr. Krause   Method of Notification At Bedside   Request Evaluate in Person   Notification Reason Labor Status     Dr. Krause at bedside assisting with pushing. Remained at bedside until delivery of infant.

## 2018-12-05 NOTE — PROGRESS NOTES
I was asked to evaluate this patient by RN. She has been pushing for 2 hours, is much more uncomfortable and getting tired. On evaluation, she is pushing with excellent maternal effort, has brought the vertex down to a +3 station with pushing. There is moderate caput and molding.    Fetal vertex is presenting ROP. Reviewed options: epidural bolus followed by attempted manual rotation to ALFREDA, continue pushing as she may deliver fetus ROP or spontaneous rotation may occur, . She is interested first in epidural rebolus, so anesthesia was notified. We will then empty the bladder and attempt manual rotation; if successful, she will continue to push and I expect will deliver vaginally within another 1/2 hour to hour (or we could also consider vacuum-assisted delivery). If rotation is not successful, she can either continue pushing as she may deliver OP, we could attempt vacuum extraction from the OP position or vacuum-assisted rotation to OA with delivery, or we could move to .    I will await epidural rebolus and then confirm with her that manual rotation is still her choice when she is more comfortable and the bladder has been emptied.    Amada Krause MD

## 2018-12-05 NOTE — ANESTHESIA POSTPROCEDURE EVALUATION
Patient: Sylvia Leonard    * No procedures listed *    Diagnosis:* No pre-op diagnosis entered *  Diagnosis Additional Information: Labor pain  Dr. Krause    Anesthesia Type:  Epidural    Note:  Anesthesia Post Evaluation         Comments:     S/P epidural for labor.   I or my partner was immediately available for management of this patient during epidural analgesia infusion.  VSS.  Doing well. Block resolved.  Neuro at baseline. Denies positional headache. Minimal side effects easily managed w/ PRN meds. No apparent anesthetic complications. No follow-up required.    Kevin Andrews MD          Last vitals:  Vitals:    12/05/18 0658 12/05/18 0713 12/05/18 0748   BP: 124/74 117/70 123/71   Pulse:      Resp:   16   Temp:   98.2  F (36.8  C)         Electronically Signed By: Kvein Andrews MD  December 5, 2018  8:23 AM

## 2018-12-05 NOTE — ANESTHESIA PROCEDURE NOTES
Peripheral nerve/Neuraxial procedure note :        Assessment/Narrative  .  .  . Comments:  Pt seen and interviewed prior to epidural placement for labor analgesia.  This epidural is to be placed in anticipation of normal vaginal delivery.  Risk discussed and consent given prior to performance of procedure.  Time out consisting of identification of patient and desire for epidural analgesia was confirmed.  Sterile prep of lumbar spine was accomplished with povidone iodine three times.  L34 interspace was identified.  Local anesthetic infiltration with 1.5% lido with epi 1/200k was performed with 1.5 cc.  17 G Tuohy needle was advanced in the midline with loss of resistance technique.  No CSF, blood, or paresthesias were noted with placement.  Test dose of 1.5% Lidocaine with epi 1/200k, 3 cc., was injected without evidence of intrathecal or intravascular injection.  Incremental bolus of 0.25% Bupivicaine was injected to a total volume of 15 cc.  Epidural cath 19 G was advanced through needle approx. 6 cm.  No paresthesia was noted with placement and aspiration of cath was negative for blood.  Catheter secured with tegaderm and tape.  Epidural infusion begun after double check of pump settings.  Nursing to inform if there are any complications, but none were noted prior to leaving patient room.  I, or my partners, remain immediately available for management of any issues or complications.  I, or my partners, will monitor at appropriate intervals.  ANTELMO Lundberg MD

## 2018-12-05 NOTE — PROVIDER NOTIFICATION
12/04/18 2224   Provider Notification   Provider Name/Title Dr. Krause   Method of Notification Phone   Request Evaluate - Remote   Notification Reason Status Update   Md updated that pt is 8/90//0 and comfortable with an epidural.  Category 1 FHR tracing.  Will continue to monitor.

## 2018-12-05 NOTE — ADDENDUM NOTE
Addendum  created 12/04/18 9871 by Mikey Lundberg MD    Anesthesia Event edited, Procedure Event Log accessed

## 2018-12-05 NOTE — PLAN OF CARE
Data: Sylvia Leonard transferred to [facility] via wheelchair at 0800. Baby transferred via parent's arms.  Action: Receiving unit notified of transfer: Yes. Patient and family notified of room change. Report given to Dione TRINIDAD RN at 0820. Belongings sent to receiving unit. Accompanied by Registered Nurse. Oriented patient to surroundings. Call light within reach. ID bands double-checked with receiving RN.  Response: Patient tolerated transfer and is stable.

## 2018-12-05 NOTE — LACTATION NOTE
"This note was copied from a baby's chart.  LC to see patient.  Her baby has been latching well.  Some attempts on the approach.  LC worked with positioning and a more \"controlled\" latch in the cross cradle hold.  Good volumes of colostrum present with HE and swallows noted when nursing. Plan for continued support prn and follow up tomorrow.  "

## 2018-12-05 NOTE — PROVIDER NOTIFICATION
12/05/18 0244   Provider Notification   Provider Name/Title Dr. Krause   Method of Notification At Bedside   Request Evaluate in Person   Notification Reason Labor Status     Dr. Krause bedside to evaluate pushing progress. After SVE Dr. Krause feels infant is in OP position. Plan to proceed with epidural rebolus to manage Pt discomfort with pushing efforts. Will perform straight cath after epidural rebolus.

## 2018-12-05 NOTE — PROVIDER NOTIFICATION
12/05/18 0010   Provider Notification   Provider Name/Title Dr. Krause   Method of Notification Phone   Request Evaluate - Remote   Notification Reason SVE;Status Update     Dr. Krause updated on SVE 10/100/+1. Pt received an epidural rebolus at 2151 and is not feeling pressure or the urge to push. Dr. Krause states to begin pushing now. MD will remain in the hospital until called for delivery.

## 2018-12-05 NOTE — ADDENDUM NOTE
Addendum  created 12/05/18 0516 by Mikey Lundberg MD    Anesthesia Event edited, Procedure Event Log accessed

## 2018-12-05 NOTE — PROVIDER NOTIFICATION
12/04/18 2159   Provider Notification   Provider Name/Title Dr. Lundberg   Method of Notification At Bedside   Request Evaluate - Remote;Evaluate in Person   Notification Reason Pain   Pt complaining of pain and some pressure.  MDA at bedside for a rebolus of the epidural.

## 2018-12-05 NOTE — ANESTHESIA PREPROCEDURE EVALUATION
PAC NOTE:       ANESTHESIA PRE EVALUATION:  Anesthesia Evaluation       history and physical reviewed .      No history of anesthetic complications          ROS/MED HX    ENT/Pulmonary:  - neg pulmonary ROS     Neurologic:  - neg neurologic ROS     Cardiovascular:  - neg cardiovascular ROS       METS/Exercise Tolerance:     Hematologic:         Musculoskeletal:         GI/Hepatic:  - neg GI/hepatic ROS       Renal/Genitourinary:         Endo:         Psychiatric:         Infectious Disease:         Malignancy:         Other:                     Physical Exam  Normal systems: cardiovascular, pulmonary and dental    Airway   Mallampati: II  TM distance: > 3 FB  Neck ROM: full  Mouth opening: > 3 cm    Dental     Cardiovascular       Pulmonary     Other findings: Lab Test        12/04/18     11/27/18     09/18/18     05/10/18                       1345          1522          1507          0939          WBC          9.0          9.6           --          5.1           HGB          11.3*        12.1         11.9         12.8          MCV          84           87            --          89            PLT          208          210           --          249            Lab Test        12/04/18 11/27/18                       1345          1522          NA           136          135           POTASSIUM    3.8          3.9           CHLORIDE     107          103           CO2          21           22            BUN          7            8             CR           0.79         0.72          ANIONGAP     8            10            PAIGE          8.6          8.6           GLC          77           74                       neg OB ROS            Anesthesia Plan      History & Physical Review      ASA Status:  .  OB Epidural Asa: 2            Postoperative Care      Consents  Anesthetic plan, risks, benefits and alternatives discussed with:  Patient..                            .

## 2018-12-05 NOTE — PROVIDER NOTIFICATION
12/05/18 0308   Provider Notification   Provider Name/Title Dr. Lundberg   Method of Notification At Bedside   Request Evaluate in Person   Notification Reason Pain     Dr. Lundberg bedside for epidural rebolus.

## 2018-12-06 VITALS
HEART RATE: 63 BPM | WEIGHT: 168 LBS | HEIGHT: 64 IN | TEMPERATURE: 97.4 F | RESPIRATION RATE: 18 BRPM | DIASTOLIC BLOOD PRESSURE: 65 MMHG | BODY MASS INDEX: 28.68 KG/M2 | SYSTOLIC BLOOD PRESSURE: 126 MMHG

## 2018-12-06 LAB — HGB BLD-MCNC: 10.4 G/DL (ref 11.7–15.7)

## 2018-12-06 PROCEDURE — 40000083 ZZH STATISTIC IP LACTATION SERVICES 1-15 MIN

## 2018-12-06 PROCEDURE — 25000132 ZZH RX MED GY IP 250 OP 250 PS 637: Performed by: OBSTETRICS & GYNECOLOGY

## 2018-12-06 PROCEDURE — 85018 HEMOGLOBIN: CPT | Performed by: OBSTETRICS & GYNECOLOGY

## 2018-12-06 PROCEDURE — 36415 COLL VENOUS BLD VENIPUNCTURE: CPT | Performed by: OBSTETRICS & GYNECOLOGY

## 2018-12-06 RX ADMIN — ACETAMINOPHEN 650 MG: 325 TABLET, FILM COATED ORAL at 08:32

## 2018-12-06 RX ADMIN — OXYCODONE HYDROCHLORIDE 5 MG: 5 TABLET ORAL at 05:16

## 2018-12-06 RX ADMIN — SENNOSIDES AND DOCUSATE SODIUM 2 TABLET: 8.6; 5 TABLET ORAL at 08:29

## 2018-12-06 RX ADMIN — IBUPROFEN 800 MG: 800 TABLET ORAL at 07:21

## 2018-12-06 RX ADMIN — ACETAMINOPHEN 650 MG: 325 TABLET, FILM COATED ORAL at 16:00

## 2018-12-06 RX ADMIN — ACETAMINOPHEN 650 MG: 325 TABLET, FILM COATED ORAL at 03:14

## 2018-12-06 RX ADMIN — IBUPROFEN 800 MG: 800 TABLET ORAL at 13:30

## 2018-12-06 RX ADMIN — OXYCODONE HYDROCHLORIDE 5 MG: 5 TABLET ORAL at 10:22

## 2018-12-06 RX ADMIN — IBUPROFEN 800 MG: 800 TABLET ORAL at 01:02

## 2018-12-06 RX ADMIN — IBUPROFEN 800 MG: 800 TABLET ORAL at 20:03

## 2018-12-06 NOTE — PROGRESS NOTES
"Postpartum Progress Note  Sylvia Leonard  [id number]    Subjective:   Patiet resting. Pelvic pain moderately controlled with POs, has required some oxycodone overnight for pain. Ice packs are not getting cold, so she doesn't know if they would help. Denies nausea and vomiting. Ambulating without difficulty. Adequate PO intake. Breast feeding.     Objective:  BP (!) 131/91  Pulse 63  Temp 97.5  F (36.4  C) (Oral)  Resp 18  Ht 1.626 m (5' 4\")  Wt 76.2 kg (168 lb)  LMP 2018 (Exact Date)  Breastfeeding? Unknown  BMI 28.84 kg/m2  General: resting comfortably, in NAD  Heart: regular rate, well perfused  Lungs: non-labored breathing, no cough  Abdomen: soft, non distended, appropriately tender to palpation, FF at U -3  Extremities: scant edema, non-tender to palpation    Labs:  Hemoglobin   Date Value Ref Range Status   2018 10.4 (L) 11.7 - 15.7 g/dL Final   2018 11.3 (L) 11.7 - 15.7 g/dL Final       Assessment/Plan:  23 year old  who is PPD#1 s/p .  Currently stable, ongoing pelvic and perineal pain. Encouraged ice packs and alternating ibuprofen and tylenol every 3 hours.   - Routine post-partum cares  - Heme: stable  - Preeclampsia: intermittent mild range BPs, otherwise asymptomatic.   - Pain: continue tylenol, ibuprofen, ice packs, hot packs as needed  - Baby: in room doing well, breast feeding  - Dispo: d/c to home tomorrow    Maria Eugenia Pisano MD  OB/GYN             "

## 2018-12-06 NOTE — PROGRESS NOTES
Public Health Nurse (PHN) met with FOB, patient was in the restroom. Discussed resources within [facility].  Provided family resources of [facility] Public Health services resource card, home visiting card, community resource guide and car seat information card given and discussed.  Offered referral for home visiting, family declined. Patient declined any questions or concerns.

## 2018-12-06 NOTE — PROVIDER NOTIFICATION
12/05/18 1908   Provider Notification   Provider Name/Title Dr. gautam   Method of Notification Phone   Request Evaluate - Remote   Notification Reason Pain   Dr. Gautam notified that pt is complaining of a lot of pain and uncomfortable even after Ibuprofen and tylenol.  Orders received to change dose of Ibuprofen to 800mg eery 6 hours and oxycodone 5-10mg every 3 hours as needed.

## 2018-12-06 NOTE — LACTATION NOTE
WILLIAM to see patient.  Her baby no longer is using the nipple shield and has been latching well.  No overall concerns.  She feels ready for discharge and was requesting to go home today, but will discuss further with RN.  WILLIAM recommended that she call if any concerns about latch or if she requires the nipple shield again following discharge.

## 2018-12-06 NOTE — PLAN OF CARE
Problem: Postpartum (Vaginal Delivery) (Adult,Obstetrics,Pediatric)  Goal: Signs and Symptoms of Listed Potential Problems Will be Absent, Minimized or Managed (Postpartum)  Signs and symptoms of listed potential problems will be absent, minimized or managed by discharge/transition of care (reference Postpartum (Vaginal Delivery) (Adult,Obstetrics,Pediatric) CPG).   Outcome: Improving  Data: Vital signs within normal limits. Postpartum checks within normal limits - see flow record. Patient eating and drinking normally. Patient able to empty bladder independently and is up ambulating. No apparent signs of infection. Laceration healing well. Patient performing self cares and is able to care for infant.  Action: Patient medicated during the shift for pain and cramping. See MAR. Patient reassessed within 1 hour after each medication and pain was improved - patient stated she was comfortable. Patient education done about breastfeeding, baby cares, self cares for pt. See flow record.  Response: Positive attachment behaviors observed with infant. Support persons FOB present.   Plan: Anticipate discharge on 12/7/18.

## 2018-12-07 NOTE — PLAN OF CARE
Problem: Patient Care Overview  Goal: Plan of Care/Patient Progress Review  Outcome: Improving  Patient is stable. Received after visit instructions, a new pump to take home. Pt was discharged with her  and baby, going home. MAIKEL Yoder assisted patient to walk out the hospital with all their belongings.

## 2018-12-07 NOTE — PROVIDER NOTIFICATION
12/06/18 1813   Provider Notification   Provider Name/Title Dr Pisano   Method of Notification Phone   Request Evaluate-Remote   Notification Reason Other  (discharge order)   Dr Pisano contacted for discharge order as patient would like to go home this evening.  Discharge orders given as long as Pediatrician is okay with baby going home.  Patient to make appointment for nurse only visit in clinic on Monday, Dec.10 for blood pressure check.  Use Motrin and Tylenol per discharge protocol as well as Tucks and stool softeners and make appointment for 6 week postpartum visit. Telephone orders read back.  Kendra Lovett

## 2018-12-07 NOTE — DISCHARGE INSTRUCTIONS
Please make appointment with nurse for a blood pressure check Monday, December 10th.  Make an appointment with OB provider for 6 week postpartum check.  Postpartum Vaginal Delivery Instructions    Lactation phone number [phone]  [facility] phone number [phone]    Activity       Ask family and friends for help when you need it.    Do not place anything in your vagina for 6 weeks.    You are not restricted on other activities, but take it easy for a few weeks to allow your body to recover from delivery.  You are able to do any activities you feel up to that point.    No driving until you have stopped taking your pain medications (usually two weeks after delivery).     Call your health care provider if you have any of these symptoms:       Increased pain, swelling, redness, or fluid around your stiches from an episiotomy or perineal tear.    A fever above 100.4 F (38 C) with or without chills when placing a thermometer under your tongue.    You soak a sanitary pad with blood within 1 hour, or you see blood clots larger than a golf ball.    Bleeding that lasts more than 6 weeks.    Vaginal discharge that smells bad.    Severe pain, cramping or tenderness in your lower belly area.    A need to urinate more frequently (use the toilet more often), more urgently (use the toilet very quickly), or it burns when you urinate.    Nausea and vomiting.    Redness, swelling or pain around a vein in your leg.    Problems breastfeeding or a red or painful area on your breast.    Chest pain and cough or are gasping for air.    Problems coping with sadness, anxiety, or depression.  If you have any concerns about hurting yourself or the baby, call your provider immediately.     You have questions or concerns after you return home.     Keep your hands clean:  Always wash your hands before touching your perineal area and stitches.  This helps reduce your risk of infection.  If your hands aren't dirty, you may use an  alcohol hand-rub to clean your hands. Keep your nails clean and short.

## 2018-12-07 NOTE — PLAN OF CARE
Problem: Postpartum (Vaginal Delivery) (Adult,Obstetrics,Pediatric)  Goal: Signs and Symptoms of Listed Potential Problems Will be Absent, Minimized or Managed (Postpartum)  Signs and symptoms of listed potential problems will be absent, minimized or managed by discharge/transition of care (reference Postpartum (Vaginal Delivery) (Adult,Obstetrics,Pediatric) CPG).   Patient is doing well with oral pain medication for discomfort.  Bottom continues to be her biggest complaint.  Talked about using Tucks, ice and warm soaks in bath once home.  Patient will be discharge with infant to home tonight.  Kendra Lovett

## 2018-12-08 ENCOUNTER — NURSE TRIAGE (OUTPATIENT)
Dept: NURSING | Facility: CLINIC | Age: 23
End: 2018-12-08

## 2018-12-08 ENCOUNTER — TELEPHONE (OUTPATIENT)
Dept: OBGYN | Facility: CLINIC | Age: 23
End: 2018-12-08

## 2018-12-08 ENCOUNTER — HOSPITAL ENCOUNTER (EMERGENCY)
Facility: CLINIC | Age: 23
Discharge: ANOTHER HOSPITAL | End: 2018-12-08
Attending: EMERGENCY MEDICINE | Admitting: EMERGENCY MEDICINE
Payer: COMMERCIAL

## 2018-12-08 ENCOUNTER — DOCUMENTATION ONLY (OUTPATIENT)
Dept: CARE COORDINATION | Facility: CLINIC | Age: 23
End: 2018-12-08

## 2018-12-08 ENCOUNTER — HOSPITAL ENCOUNTER (INPATIENT)
Facility: CLINIC | Age: 23
LOS: 1 days | Discharge: HOME | End: 2018-12-09
Attending: OBSTETRICS & GYNECOLOGY | Admitting: OBSTETRICS & GYNECOLOGY
Payer: COMMERCIAL

## 2018-12-08 VITALS
DIASTOLIC BLOOD PRESSURE: 90 MMHG | RESPIRATION RATE: 20 BRPM | SYSTOLIC BLOOD PRESSURE: 149 MMHG | TEMPERATURE: 97.7 F | BODY MASS INDEX: 26.84 KG/M2 | HEIGHT: 64 IN | OXYGEN SATURATION: 98 % | WEIGHT: 157.19 LBS

## 2018-12-08 DIAGNOSIS — O13.9 GESTATIONAL HYPERTENSION, ANTEPARTUM: Primary | ICD-10-CM

## 2018-12-08 DIAGNOSIS — R10.2 VAGINAL PAIN: ICD-10-CM

## 2018-12-08 DIAGNOSIS — R30.0 DYSURIA: ICD-10-CM

## 2018-12-08 DIAGNOSIS — N30.01 ACUTE CYSTITIS WITH HEMATURIA: ICD-10-CM

## 2018-12-08 DIAGNOSIS — O14.90 PRE-ECLAMPSIA, ANTEPARTUM: ICD-10-CM

## 2018-12-08 LAB
ALBUMIN SERPL-MCNC: 2.3 G/DL (ref 3.4–5)
ALBUMIN UR-MCNC: 100 MG/DL
ALP SERPL-CCNC: 106 U/L (ref 40–150)
ALT SERPL W P-5'-P-CCNC: 53 U/L (ref 0–50)
ANION GAP SERPL CALCULATED.3IONS-SCNC: 7 MMOL/L (ref 3–14)
APPEARANCE UR: ABNORMAL
AST SERPL W P-5'-P-CCNC: 58 U/L (ref 0–45)
BACTERIA #/AREA URNS HPF: ABNORMAL /HPF
BASOPHILS # BLD AUTO: 0 10E9/L (ref 0–0.2)
BASOPHILS NFR BLD AUTO: 0.5 %
BILIRUB SERPL-MCNC: 0.1 MG/DL (ref 0.2–1.3)
BILIRUB UR QL STRIP: NEGATIVE
BUN SERPL-MCNC: 8 MG/DL (ref 7–30)
CALCIUM SERPL-MCNC: 8.2 MG/DL (ref 8.5–10.1)
CHLORIDE SERPL-SCNC: 110 MMOL/L (ref 94–109)
CO2 SERPL-SCNC: 25 MMOL/L (ref 20–32)
COLOR UR AUTO: YELLOW
CREAT SERPL-MCNC: 0.73 MG/DL (ref 0.52–1.04)
DIFFERENTIAL METHOD BLD: ABNORMAL
EOSINOPHIL # BLD AUTO: 0.4 10E9/L (ref 0–0.7)
EOSINOPHIL NFR BLD AUTO: 4.6 %
ERYTHROCYTE [DISTWIDTH] IN BLOOD BY AUTOMATED COUNT: 12.5 % (ref 10–15)
GFR SERPL CREATININE-BSD FRML MDRD: >90 ML/MIN/1.7M2
GLUCOSE SERPL-MCNC: 100 MG/DL (ref 70–99)
GLUCOSE UR STRIP-MCNC: NEGATIVE MG/DL
HCT VFR BLD AUTO: 32.3 % (ref 35–47)
HGB BLD-MCNC: 10.5 G/DL (ref 11.7–15.7)
HGB UR QL STRIP: ABNORMAL
IMM GRANULOCYTES # BLD: 0.1 10E9/L (ref 0–0.4)
IMM GRANULOCYTES NFR BLD: 0.8 %
KETONES UR STRIP-MCNC: NEGATIVE MG/DL
LEUKOCYTE ESTERASE UR QL STRIP: ABNORMAL
LYMPHOCYTES # BLD AUTO: 1.1 10E9/L (ref 0.8–5.3)
LYMPHOCYTES NFR BLD AUTO: 12.7 %
MCH RBC QN AUTO: 28.1 PG (ref 26.5–33)
MCHC RBC AUTO-ENTMCNC: 32.5 G/DL (ref 31.5–36.5)
MCV RBC AUTO: 86 FL (ref 78–100)
MONOCYTES # BLD AUTO: 0.3 10E9/L (ref 0–1.3)
MONOCYTES NFR BLD AUTO: 3.6 %
MUCOUS THREADS #/AREA URNS LPF: PRESENT /LPF
NEUTROPHILS # BLD AUTO: 6.4 10E9/L (ref 1.6–8.3)
NEUTROPHILS NFR BLD AUTO: 77.8 %
NITRATE UR QL: NEGATIVE
NRBC # BLD AUTO: 0 10*3/UL
NRBC BLD AUTO-RTO: 0 /100
PH UR STRIP: 5 PH (ref 5–7)
PLATELET # BLD AUTO: 242 10E9/L (ref 150–450)
POTASSIUM SERPL-SCNC: 3.8 MMOL/L (ref 3.4–5.3)
PROT SERPL-MCNC: 5.9 G/DL (ref 6.8–8.8)
RBC # BLD AUTO: 3.74 10E12/L (ref 3.8–5.2)
RBC #/AREA URNS AUTO: 148 /HPF (ref 0–2)
SODIUM SERPL-SCNC: 142 MMOL/L (ref 133–144)
SOURCE: ABNORMAL
SP GR UR STRIP: 1.03 (ref 1–1.03)
SQUAMOUS #/AREA URNS AUTO: 5 /HPF (ref 0–1)
UROBILINOGEN UR STRIP-MCNC: 0 MG/DL (ref 0–2)
WBC # BLD AUTO: 8.3 10E9/L (ref 4–11)
WBC #/AREA URNS AUTO: >182 /HPF (ref 0–5)
WBC CLUMPS #/AREA URNS HPF: PRESENT /HPF

## 2018-12-08 PROCEDURE — 25000128 H RX IP 250 OP 636: Performed by: OBSTETRICS & GYNECOLOGY

## 2018-12-08 PROCEDURE — 96375 TX/PRO/DX INJ NEW DRUG ADDON: CPT

## 2018-12-08 PROCEDURE — 87088 URINE BACTERIA CULTURE: CPT | Performed by: EMERGENCY MEDICINE

## 2018-12-08 PROCEDURE — 25000132 ZZH RX MED GY IP 250 OP 250 PS 637: Performed by: EMERGENCY MEDICINE

## 2018-12-08 PROCEDURE — 25000132 ZZH RX MED GY IP 250 OP 250 PS 637: Performed by: OBSTETRICS & GYNECOLOGY

## 2018-12-08 PROCEDURE — 80053 COMPREHEN METABOLIC PANEL: CPT | Performed by: EMERGENCY MEDICINE

## 2018-12-08 PROCEDURE — 12000029 ZZH R&B OB INTERMEDIATE

## 2018-12-08 PROCEDURE — 87186 SC STD MICRODIL/AGAR DIL: CPT | Performed by: EMERGENCY MEDICINE

## 2018-12-08 PROCEDURE — 87086 URINE CULTURE/COLONY COUNT: CPT | Performed by: EMERGENCY MEDICINE

## 2018-12-08 PROCEDURE — 85025 COMPLETE CBC W/AUTO DIFF WBC: CPT | Performed by: EMERGENCY MEDICINE

## 2018-12-08 PROCEDURE — 99285 EMERGENCY DEPT VISIT HI MDM: CPT | Mod: 25

## 2018-12-08 PROCEDURE — 25000128 H RX IP 250 OP 636: Performed by: EMERGENCY MEDICINE

## 2018-12-08 PROCEDURE — 81001 URINALYSIS AUTO W/SCOPE: CPT | Performed by: EMERGENCY MEDICINE

## 2018-12-08 PROCEDURE — 96374 THER/PROPH/DIAG INJ IV PUSH: CPT

## 2018-12-08 RX ORDER — BISACODYL 10 MG
10 SUPPOSITORY, RECTAL RECTAL DAILY PRN
Status: DISCONTINUED | OUTPATIENT
Start: 2018-12-10 | End: 2018-12-08

## 2018-12-08 RX ORDER — LABETALOL HYDROCHLORIDE 5 MG/ML
40 INJECTION, SOLUTION INTRAVENOUS EVERY 10 MIN PRN
Status: CANCELLED | OUTPATIENT
Start: 2018-12-08

## 2018-12-08 RX ORDER — MAGNESIUM SULFATE HEPTAHYDRATE 500 MG/ML
4 INJECTION, SOLUTION INTRAMUSCULAR; INTRAVENOUS
Status: CANCELLED | OUTPATIENT
Start: 2018-12-08

## 2018-12-08 RX ORDER — IBUPROFEN 200 MG
800 TABLET ORAL EVERY 12 HOURS PRN
COMMUNITY
End: 2019-11-07

## 2018-12-08 RX ORDER — ACETAMINOPHEN 325 MG/1
975 TABLET ORAL EVERY 6 HOURS PRN
Status: DISCONTINUED | OUTPATIENT
Start: 2018-12-08 | End: 2018-12-08 | Stop reason: HOSPADM

## 2018-12-08 RX ORDER — LABETALOL HYDROCHLORIDE 5 MG/ML
40 INJECTION, SOLUTION INTRAVENOUS EVERY 10 MIN PRN
Status: DISCONTINUED | OUTPATIENT
Start: 2018-12-08 | End: 2018-12-09 | Stop reason: HOSPADM

## 2018-12-08 RX ORDER — OXYCODONE HYDROCHLORIDE 5 MG/1
5 TABLET ORAL EVERY 4 HOURS PRN
Status: DISCONTINUED | OUTPATIENT
Start: 2018-12-08 | End: 2018-12-08 | Stop reason: HOSPADM

## 2018-12-08 RX ORDER — MORPHINE SULFATE 4 MG/ML
4 INJECTION, SOLUTION INTRAMUSCULAR; INTRAVENOUS
Status: COMPLETED | OUTPATIENT
Start: 2018-12-08 | End: 2018-12-08

## 2018-12-08 RX ORDER — OXYTOCIN/0.9 % SODIUM CHLORIDE 30/500 ML
340 PLASTIC BAG, INJECTION (ML) INTRAVENOUS CONTINUOUS PRN
Status: DISCONTINUED | OUTPATIENT
Start: 2018-12-08 | End: 2018-12-08

## 2018-12-08 RX ORDER — OXYTOCIN/0.9 % SODIUM CHLORIDE 30/500 ML
100 PLASTIC BAG, INJECTION (ML) INTRAVENOUS CONTINUOUS
Status: DISCONTINUED | OUTPATIENT
Start: 2018-12-08 | End: 2018-12-08

## 2018-12-08 RX ORDER — FENTANYL CITRATE 50 UG/ML
100 INJECTION, SOLUTION INTRAMUSCULAR; INTRAVENOUS ONCE
Status: COMPLETED | OUTPATIENT
Start: 2018-12-08 | End: 2018-12-08

## 2018-12-08 RX ORDER — OXYCODONE HYDROCHLORIDE 5 MG/1
5 TABLET ORAL EVERY 4 HOURS PRN
Status: DISCONTINUED | OUTPATIENT
Start: 2018-12-08 | End: 2018-12-09 | Stop reason: HOSPADM

## 2018-12-08 RX ORDER — NIFEDIPINE 10 MG/1
10 CAPSULE ORAL
Status: CANCELLED | OUTPATIENT
Start: 2018-12-08

## 2018-12-08 RX ORDER — LABETALOL HYDROCHLORIDE 5 MG/ML
80 INJECTION, SOLUTION INTRAVENOUS EVERY 10 MIN PRN
Status: DISCONTINUED | OUTPATIENT
Start: 2018-12-08 | End: 2018-12-09 | Stop reason: HOSPADM

## 2018-12-08 RX ORDER — CEFTRIAXONE 1 G/1
1 INJECTION, POWDER, FOR SOLUTION INTRAMUSCULAR; INTRAVENOUS EVERY 24 HOURS
Status: CANCELLED | OUTPATIENT
Start: 2018-12-08

## 2018-12-08 RX ORDER — LABETALOL HYDROCHLORIDE 5 MG/ML
80 INJECTION, SOLUTION INTRAVENOUS EVERY 10 MIN PRN
Status: CANCELLED | OUTPATIENT
Start: 2018-12-08

## 2018-12-08 RX ORDER — ACETAMINOPHEN 325 MG/1
975 TABLET ORAL EVERY 6 HOURS PRN
Status: DISCONTINUED | OUTPATIENT
Start: 2018-12-08 | End: 2018-12-09 | Stop reason: HOSPADM

## 2018-12-08 RX ORDER — IBUPROFEN 800 MG/1
800 TABLET, FILM COATED ORAL EVERY 6 HOURS PRN
Status: DISCONTINUED | OUTPATIENT
Start: 2018-12-08 | End: 2018-12-08

## 2018-12-08 RX ORDER — KETOROLAC TROMETHAMINE 15 MG/ML
15 INJECTION, SOLUTION INTRAMUSCULAR; INTRAVENOUS ONCE
Status: COMPLETED | OUTPATIENT
Start: 2018-12-08 | End: 2018-12-08

## 2018-12-08 RX ORDER — NALOXONE HYDROCHLORIDE 0.4 MG/ML
.1-.4 INJECTION, SOLUTION INTRAMUSCULAR; INTRAVENOUS; SUBCUTANEOUS
Status: DISCONTINUED | OUTPATIENT
Start: 2018-12-08 | End: 2018-12-09 | Stop reason: HOSPADM

## 2018-12-08 RX ORDER — CEPHALEXIN 500 MG/1
500 CAPSULE ORAL ONCE
Status: COMPLETED | OUTPATIENT
Start: 2018-12-08 | End: 2018-12-08

## 2018-12-08 RX ORDER — LABETALOL HYDROCHLORIDE 5 MG/ML
20 INJECTION, SOLUTION INTRAVENOUS EVERY 10 MIN PRN
Status: CANCELLED | OUTPATIENT
Start: 2018-12-08

## 2018-12-08 RX ORDER — AMOXICILLIN 250 MG
1 CAPSULE ORAL 2 TIMES DAILY
Status: DISCONTINUED | OUTPATIENT
Start: 2018-12-08 | End: 2018-12-08

## 2018-12-08 RX ORDER — ONDANSETRON 2 MG/ML
4 INJECTION INTRAMUSCULAR; INTRAVENOUS EVERY 30 MIN PRN
Status: DISCONTINUED | OUTPATIENT
Start: 2018-12-08 | End: 2018-12-08 | Stop reason: HOSPADM

## 2018-12-08 RX ORDER — ACETAMINOPHEN 325 MG/1
975 TABLET ORAL EVERY 6 HOURS PRN
Status: DISCONTINUED | OUTPATIENT
Start: 2018-12-08 | End: 2018-12-08

## 2018-12-08 RX ORDER — METHYLERGONOVINE MALEATE 0.2 MG/ML
200 INJECTION INTRAVENOUS
Status: DISCONTINUED | OUTPATIENT
Start: 2018-12-08 | End: 2018-12-08

## 2018-12-08 RX ORDER — NALOXONE HYDROCHLORIDE 0.4 MG/ML
.1-.4 INJECTION, SOLUTION INTRAMUSCULAR; INTRAVENOUS; SUBCUTANEOUS
Status: DISCONTINUED | OUTPATIENT
Start: 2018-12-08 | End: 2018-12-08

## 2018-12-08 RX ORDER — AMOXICILLIN 250 MG
2 CAPSULE ORAL 2 TIMES DAILY
Status: DISCONTINUED | OUTPATIENT
Start: 2018-12-08 | End: 2018-12-08

## 2018-12-08 RX ORDER — CEFTRIAXONE 1 G/1
1 INJECTION, POWDER, FOR SOLUTION INTRAMUSCULAR; INTRAVENOUS EVERY 24 HOURS
Status: DISCONTINUED | OUTPATIENT
Start: 2018-12-08 | End: 2018-12-09 | Stop reason: HOSPADM

## 2018-12-08 RX ORDER — LABETALOL HYDROCHLORIDE 5 MG/ML
20 INJECTION, SOLUTION INTRAVENOUS EVERY 10 MIN PRN
Status: DISCONTINUED | OUTPATIENT
Start: 2018-12-08 | End: 2018-12-09 | Stop reason: HOSPADM

## 2018-12-08 RX ORDER — MAGNESIUM SULFATE HEPTAHYDRATE 40 MG/ML
4 INJECTION, SOLUTION INTRAVENOUS
Status: CANCELLED | OUTPATIENT
Start: 2018-12-08

## 2018-12-08 RX ORDER — LORAZEPAM 2 MG/ML
2 INJECTION INTRAMUSCULAR
Status: DISCONTINUED | OUTPATIENT
Start: 2018-12-08 | End: 2018-12-09 | Stop reason: HOSPADM

## 2018-12-08 RX ORDER — OXYTOCIN 10 [USP'U]/ML
10 INJECTION, SOLUTION INTRAMUSCULAR; INTRAVENOUS
Status: DISCONTINUED | OUTPATIENT
Start: 2018-12-08 | End: 2018-12-08

## 2018-12-08 RX ORDER — SODIUM CHLORIDE, SODIUM LACTATE, POTASSIUM CHLORIDE, CALCIUM CHLORIDE 600; 310; 30; 20 MG/100ML; MG/100ML; MG/100ML; MG/100ML
10-125 INJECTION, SOLUTION INTRAVENOUS CONTINUOUS
Status: CANCELLED | OUTPATIENT
Start: 2018-12-08

## 2018-12-08 RX ORDER — ACETAMINOPHEN 500 MG
1000 TABLET ORAL EVERY 12 HOURS PRN
COMMUNITY
End: 2019-11-07

## 2018-12-08 RX ORDER — NIFEDIPINE 10 MG/1
10 CAPSULE ORAL
Status: DISCONTINUED | OUTPATIENT
Start: 2018-12-08 | End: 2018-12-09 | Stop reason: HOSPADM

## 2018-12-08 RX ORDER — HYDROCORTISONE 2.5 %
CREAM (GRAM) TOPICAL 3 TIMES DAILY PRN
Status: DISCONTINUED | OUTPATIENT
Start: 2018-12-08 | End: 2018-12-08

## 2018-12-08 RX ORDER — LORAZEPAM 2 MG/ML
2 INJECTION INTRAMUSCULAR
Status: CANCELLED | OUTPATIENT
Start: 2018-12-08

## 2018-12-08 RX ORDER — AMOXICILLIN 250 MG
1 CAPSULE ORAL
Status: DISCONTINUED | OUTPATIENT
Start: 2018-12-08 | End: 2018-12-09 | Stop reason: HOSPADM

## 2018-12-08 RX ORDER — LANOLIN 100 %
OINTMENT (GRAM) TOPICAL
Status: DISCONTINUED | OUTPATIENT
Start: 2018-12-08 | End: 2018-12-08

## 2018-12-08 RX ORDER — LABETALOL 200 MG/1
200 TABLET, FILM COATED ORAL EVERY 12 HOURS SCHEDULED
Status: DISCONTINUED | OUTPATIENT
Start: 2018-12-08 | End: 2018-12-09 | Stop reason: HOSPADM

## 2018-12-08 RX ORDER — SODIUM CHLORIDE 9 MG/ML
INJECTION, SOLUTION INTRAVENOUS CONTINUOUS
Status: DISCONTINUED | OUTPATIENT
Start: 2018-12-08 | End: 2018-12-09 | Stop reason: HOSPADM

## 2018-12-08 RX ORDER — MAGNESIUM SULFATE HEPTAHYDRATE 500 MG/ML
4 INJECTION, SOLUTION INTRAMUSCULAR; INTRAVENOUS
Status: DISCONTINUED | OUTPATIENT
Start: 2018-12-08 | End: 2018-12-09 | Stop reason: HOSPADM

## 2018-12-08 RX ORDER — MAGNESIUM SULFATE HEPTAHYDRATE 40 MG/ML
4 INJECTION, SOLUTION INTRAVENOUS
Status: DISCONTINUED | OUTPATIENT
Start: 2018-12-08 | End: 2018-12-09 | Stop reason: HOSPADM

## 2018-12-08 RX ORDER — ZOLPIDEM TARTRATE 5 MG/1
5 TABLET ORAL
Status: DISCONTINUED | OUTPATIENT
Start: 2018-12-08 | End: 2018-12-09 | Stop reason: HOSPADM

## 2018-12-08 RX ADMIN — ZOLPIDEM TARTRATE 5 MG: 5 TABLET, COATED ORAL at 23:51

## 2018-12-08 RX ADMIN — ONDANSETRON 4 MG: 2 INJECTION INTRAMUSCULAR; INTRAVENOUS at 15:11

## 2018-12-08 RX ADMIN — FENTANYL CITRATE 100 MCG: 50 INJECTION, SOLUTION INTRAMUSCULAR; INTRAVENOUS at 23:51

## 2018-12-08 RX ADMIN — SODIUM CHLORIDE: 9 INJECTION, SOLUTION INTRAVENOUS at 22:04

## 2018-12-08 RX ADMIN — CEPHALEXIN 500 MG: 500 CAPSULE ORAL at 16:46

## 2018-12-08 RX ADMIN — SENNOSIDES AND DOCUSATE SODIUM 1 TABLET: 8.6; 5 TABLET ORAL at 23:50

## 2018-12-08 RX ADMIN — MORPHINE SULFATE 4 MG: 4 INJECTION INTRAVENOUS at 15:36

## 2018-12-08 RX ADMIN — LABETALOL HCL 200 MG: 200 TABLET, FILM COATED ORAL at 22:04

## 2018-12-08 RX ADMIN — KETOROLAC TROMETHAMINE 15 MG: 15 INJECTION, SOLUTION INTRAMUSCULAR; INTRAVENOUS at 15:11

## 2018-12-08 RX ADMIN — OXYCODONE HYDROCHLORIDE 5 MG: 5 TABLET ORAL at 18:11

## 2018-12-08 RX ADMIN — CEFTRIAXONE SODIUM 1 G: 1 INJECTION, POWDER, FOR SOLUTION INTRAMUSCULAR; INTRAVENOUS at 22:04

## 2018-12-08 RX ADMIN — OXYCODONE HYDROCHLORIDE 5 MG: 5 TABLET ORAL at 22:00

## 2018-12-08 RX ADMIN — ACETAMINOPHEN 975 MG: 325 TABLET, FILM COATED ORAL at 23:50

## 2018-12-08 RX ADMIN — ACETAMINOPHEN 975 MG: 325 TABLET, FILM COATED ORAL at 18:11

## 2018-12-08 ASSESSMENT — ENCOUNTER SYMPTOMS
HEADACHES: 0
DIFFICULTY URINATING: 0
VOMITING: 0
SHORTNESS OF BREATH: 0
NAUSEA: 1

## 2018-12-08 NOTE — ED PROVIDER NOTES
History     Chief Complaint:  Postpartum Complications    HPI   Sylvia Leonard is a  23 year old female 3 days post-partum who presents to the emergency department for vaginal pain. The patient reports 2 weeks prior to delivery, the patient had elevated blood pressure and induced labor at 37 weeks on 18. The patient delivered the following morning, and complications during delivery included superficial grade 1 vaginal tear. Since delivery, the patient has been experiencing sharp vaginal pain that began to worsen yesterday. The patient's pain became unbearable this morning, where she states she hasn't been able to move due to pain. She has been taking ibuprofen and Tylenol without any improvement of pain. She endorses nausea. She has was not placed on medications for preeclampsia prior to delivery. Denies fevers, chest pain, shortness of breath, vomiting, difficulty urinating, vaginal odor, abnormal vaginal discharge/bleeding, headache, leg swelling. Patient's OB/GYN is Dr. Amada Krause.     Allergies:  No known drug allergies     Medications:    Prenatal multivitamin plus iron  Breast pump  Prilosec     Past Medical History:    Hypertension  Migraine    Past Surgical History:    History reviewed. No pertinent surgical history.     Family History:    Hypertension  Hyperlipidemia     Social History:  Smoking status: Never smoker  Alcohol use: No   Marital Status:   [2]    Review of Systems   Respiratory: Negative for shortness of breath.    Cardiovascular: Negative for chest pain and leg swelling.   Gastrointestinal: Positive for nausea. Negative for vomiting.   Genitourinary: Positive for vaginal pain. Negative for difficulty urinating, vaginal bleeding and vaginal discharge.   Neurological: Negative for headaches.   All other systems reviewed and are negative.    Physical Exam   Patient Vitals for the past 24 hrs:   BP Temp Temp src Heart Rate Resp SpO2 Height Weight   18 1745 (!) 143/100 - -  "- - - - -   12/08/18 1730 (!) 136/92 - - - - - - -   12/08/18 1715 (!) 149/101 - - - - 98 % - -   12/08/18 1700 132/77 - - - - 97 % - -   12/08/18 1645 (!) 134/97 - - - - 98 % - -   12/08/18 1615 - - - - - 100 % - -   12/08/18 1600 (!) 138/95 - - - - 100 % - -   12/08/18 1545 135/84 - - - - 99 % - -   12/08/18 1536 - - - - - 98 % - -   12/08/18 1535 144/90 - - - - - - -   12/08/18 1426 153/90 97.7  F (36.5  C) Oral 107 20 100 % 1.626 m (5' 4\") 71.3 kg (157 lb 3 oz)      Physical Exam  Nursing note and vitals reviewed.  Constitutional: Well nourished. Appears uncomfortable  Eyes: Conjunctiva normal.  Pupils are equal, round, and reactive to light.   ENT: Nose normal. Mucous membranes pink and moist.    Neck: Normal range of motion.  CVS: Rate as above.  Normal heart sounds.  No murmur.  Pulmonary: Lungs clear to auscultation bilaterally. No wheezes/rales/rhonchi.  GI: Abdomen soft. Nontender, nondistended. No rigidity or guarding.    : No significant labial swelling though tender to touch; no warmth/erythema; sutures at R. Lower labial region without induration.  No significant vaginal bleeding. Bimanual exam deferred by patient. Chaperone Rosalia EMT  MSK: No calf tenderness or swelling.  Neuro: Alert. Follows simple commands.  Skin: Skin is warm and dry. No rash noted.   Psychiatric: Normal affect.     Emergency Department Course   Laboratory:  CBC: HGB 10.5 (L) WNL (WBC 8.3, )  CMP: Chloride 110 (H), Glucose 100 (H), Calcium 8.2 (L), Bilirubin 0.1 (L), Albumin 2.3 (L), Protein total 5.9 (L), ALT 53 (H), AST 58 (H) (Creatinine 0.73)   UA: Urine bloo Large, Protein albumin 100, Leukocyte esterase Large, WBC/HPF >182, RBC/ (H), WBC clumps Present, Bacteria Moderate, Squamous epithelial/HPF 5 (H), Mucous Present   Urine culture: Pending.     Interventions:  1511: Zofran 4 mg IV  1511: Toradol 15 mg IV  1536: Morphine 4 mg IV  1646: Cephalexin 500 mg oral    Emergency Department Course:  Peripheral IV " established. Blood drawn for basic laboratory. Results as noted above.    Past medical records, nursing notes, and vitals reviewed.  1511: I performed an exam of the patient and obtained history, as documented above.   Patient was given the above interventions while here in the emergency department.   1600: I reassessed the patient, she states her pain is much improved.   1627: I rechecked the patient.  BP slowly improving.    1643: I spoke with Dr. Chen of OB/GYN. She will come down and evaluate the patient here in the emergency department.   1726: OB/GYN is at bedside.   1742: I was updated by OB/GYN. She will admit the patient for further care, monitoring, and treatment.     Impression & Plan    Medical Decision Making:  Patient is a 24 yo female 3 days postpartum s/p vaginal delivery presenting with vaginal pain.  Patient reports history of preeclampsia in pregnancy though not medicated.  Presents to ED with mildly elevated BP.  Her pain was controlled in the ED with some improvements in her blood pressure.  She was found to have a UTI and received antibiotics.  The patient is not septic appearing.  The patient was evaluated by OB team given my concern for preeclampsia as noted proteinuria and mild transaminitis.  OB team accepted admission but stated no urgent interventions (including Magnesium) at this time for BP control.  Patient agreeable to plan of care.     Diagnosis:    ICD-10-CM   1. Spontaneous vaginal delivery O80   2. Vaginal pain R10.2   3. Pre-eclampsia, antepartum O14.90   4. Acute cystitis with hematuria N30.01     Disposition:  Admitted to medicine.     Crys Roland  12/8/2018   [facility] EMERGENCY DEPARTMENT  Crys GRACIA Do, am serving as a scribe at 3:11 PM on 12/8/2018 to document services personally performed by Krysten Uribe DO based on my observations and the provider's statements to me.       Krysten Uribe DO  12/08/18 2002
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
## 2018-12-08 NOTE — ED TRIAGE NOTES
Vaginal delivery on 12/05/2018 with vaginal/episiotomy pain.  Patient alert and oriented x3.  Airway, breathing and circulation intact.

## 2018-12-08 NOTE — TELEPHONE ENCOUNTER
"PPD #3 from a vaginal delivery.  Having a throbbing pain in the vaginal area. 7-8/10 on pain scale.  Had a vaginal delivery with 1st degree laceration on 12/5.  States she pushed for 4 hours and had swelling after delivery. Currently taking Ibuprofen 800mg alternating with Tylenol 1,000mg every 3 hours. No increase in swelling since discharge. Has been putting Vaseline on the area as was concerned her stitches were rubbing on her pad causing increased pain. Using Tucks pads and Sitz baths 2-3x per day. Sitz baths make her pain worse.  Has been applying ice which is a very temporary relief measure. Once she stands pain is significant therefore she has not been able to get out of bed. No abnormal discharge, bleeding minimal (\"almost gone\"), no vomiting or fever. Some nausea due to pain level.  On review of documentation, she required oxycodone after delivery for pain control. Patient states she weaned off of it prior to discharge home; did not want to be on it at the time of discharge.    Patient feels her pain is too significant to wait until Monday to be seen in the office and what she describes sounds atypical s/p 1st degree laceration repair  -- I therefore asked her to go to the ED for evaluation.   Aurea Ghotra, DO  OB/GYN    "

## 2018-12-08 NOTE — ED NOTES
[facility]  ED Nurse Handoff Report    Sylvia Leonard is a 23 year old female   ED Chief complaint: Postpartum Complications  . ED Diagnosis:   Final diagnoses:   Spontaneous vaginal delivery   Vaginal pain   Pre-eclampsia, antepartum   Acute cystitis with hematuria     Allergies: No Known Allergies    Code Status: Full Code  Activity level - Baseline/Home:  Independent. Activity Level - Current:   Independent. Lift room needed: No. Bariatric: No   Needed: No   Isolation: No. Infection: Not Applicable.     Vital Signs:   Vitals:    12/08/18 1615 12/08/18 1645 12/08/18 1700 12/08/18 1715   BP:  (!) 134/97 132/77 (!) 149/101   Resp:       Temp:       TempSrc:       SpO2: 100% 98% 97% 98%   Weight:       Height:           Cardiac Rhythm:  ,      Pain level: 0-10 Pain Scale: 4  Patient confused: No. Patient Falls Risk: Yes.   Elimination Status: Has voided   Patient Report - Initial Complaint: vaginal pain . Focused Assessment: Episiotomy, lower abdominal pain, denies urinary symptoms.   Tests Performed:   Labs Ordered and Resulted from Time of ED Arrival Up to the Time of Departure from the ED   CBC WITH PLATELETS DIFFERENTIAL - Abnormal; Notable for the following:        Result Value    RBC Count 3.74 (*)     Hemoglobin 10.5 (*)     Hematocrit 32.3 (*)     All other components within normal limits   COMPREHENSIVE METABOLIC PANEL - Abnormal; Notable for the following:     Chloride 110 (*)     Glucose 100 (*)     Calcium 8.2 (*)     Bilirubin Total 0.1 (*)     Albumin 2.3 (*)     Protein Total 5.9 (*)     ALT 53 (*)     AST 58 (*)     All other components within normal limits   ROUTINE UA WITH MICROSCOPIC REFLEX TO CULTURE - Abnormal; Notable for the following:     Blood Urine Large (*)     Protein Albumin Urine 100 (*)     Leukocyte Esterase Urine Large (*)     WBC Urine >182 (*)     RBC Urine 148 (*)     WBC Clumps Present (*)     Bacteria Urine Moderate (*)     Squamous Epithelial /HPF Urine 5  (*)     Mucous Urine Present (*)     All other components within normal limits   URINE CULTURE AEROBIC BACTERIAL     No orders to display     Treatments provided: see MAR  Family Comments:  at bedside  OBS brochure/video discussed/provided to patient:  Yes  ED Medications:   Medications   ondansetron (ZOFRAN) injection 4 mg (4 mg Intravenous Given 12/8/18 1511)   ketorolac (TORADOL) injection 15 mg (15 mg Intravenous Given 12/8/18 1511)   morphine (PF) injection 4 mg (4 mg Intravenous Given 12/8/18 1536)   cephALEXin (KEFLEX) capsule 500 mg (500 mg Oral Given 12/8/18 1646)     Drips infusing:  No  For the majority of the shift, the patient's behavior Green. Interventions performed were none.     Severe Sepsis OR Septic Shock Diagnosis Present: No      ED Nurse Name/Phone Number: Zehra Hook,   5:46 PM

## 2018-12-08 NOTE — PROGRESS NOTES
[facility] will be sharing updates with you on Maternal Child Health Referral requests for home care services.  This is for care coordination purposes and alert you to referral status.  We received the referral for  Sylvia Leonard; MRN [id number] and want to update you:    [facility] has made 2 attempts to contact patient by phone and text message over the last 4 days.   We have not had any response from patient.  Final message was left advising patient to follow up with Primary Care Providers for mom and baby.    Sincerely [facility]  Adrián Ward  [phone]

## 2018-12-08 NOTE — H&P
"  2018    Sylvia Avery  [id number]  OB Admit History & Physical      Ms. Avery  presnted to the ED secondary to vaginal pain.     PPD #3 from a vaginal delivery at 37 weeks after IOL for preeclampsia, 1st degree perineal laceration s/p repair.    Patient reporting throbbing pain in the vaginal area. 7-8/10 on pain scale. States she pushed for 4 hours and had swelling after delivery. Currently taking Ibuprofen 800mg alternating with Tylenol 1,000mg every 3 hours which is not controlling her pain. No increase in swelling since discharge from the hospital post partum. Has been putting Vaseline on the area as was concerned her stitches were rubbing on her pad causing increased pain. Using Tucks pads and Sitz baths 2-3x per day. Sitz baths make her pain worse. Has been applying ice which is a very temporary relief measure. Once she stands pain is significant therefore she has not been able to get out of bed. No abnormal discharge, bleeding minimal (\"almost gone\"), no vomiting, fever or chills. She has been nauseous and has had once daily episodes of \"seeing stars.\" No headache or RUQ pain.     She has received Toradol and Motrin in the ED for pain control. Tearful.     Patient is currently breastfeeding / pumping for .    She is a 23 year old   Her OB history:   Obstetric History       T1      L1     SAB0   TAB0   Ectopic0   Multiple0   Live Births1       # Outcome Date GA Lbr Jose/2nd Weight Sex Delivery Anes PTL Lv   2 Term 18 37w2d 05:13 / 04:43 7 lb 1.9 oz (3.23 kg) F Vag-Spont EPI  SEAMUS      Name: JUAN AVERY      Complications: Preeclampsia/Hypertension      Apgar1:  9                Apgar5: 9   1 AB 10/2013 5w0d                      Past Medical History:   Diagnosis Date     Hypertension      Migraine           Past Surgical History:   Procedure Laterality Date     NO HISTORY OF SURGERY       wisdom teeth           Current Outpatient Prescriptions   Medication " Sig Dispense Refill     Acetaminophen (TYLENOL PO)        IBUPROFEN PO        Prenatal Vit-Fe Fumarate-FA (PRENATAL MULTIVITAMIN PLUS IRON) 27-0.8 MG TABS per tablet Take 1 tablet by mouth daily 100 tablet 3     Misc. Devices (BREAST PUMP) MISC 1 each as needed 1 each 0     Omeprazole Magnesium (PRILOSEC OTC PO) Take 20 mg by mouth daily         Allergies: Review of patient's allergies indicates no known allergies.      REVIEW OF SYSTEMS:  NEUROLOGIC:  Negative  EYES:  Negative  ENT:  Negative  GI:  Negative  BREAST:  Negative  :  Negative  GYN:  Negative  CV:  Negative  PULMONARY:  Negative  MUSCULOSKELETAL:  Negative  PSYCH:  Negative        Social History     Social History     Marital status:      Spouse name: Jose Manuel     Number of children: N/A     Years of education: N/A     Occupational History     Not on file.     Social History Main Topics     Smoking status: Never Smoker     Smokeless tobacco: Never Used     Alcohol use No     Drug use: No     Sexual activity: Yes     Partners: Male     Other Topics Concern     Not on file     Social History Narrative      Family History   Problem Relation Age of Onset     Hypertension Mother      Hyperlipidemia Mother      Cerebrovascular Disease Maternal Grandmother              Vitals:      153/90 on presentation  144/90  135/84  138/95  Vitals:    12/08/18 1700 12/08/18 1715 12/08/18 1730 12/08/18 1745   BP: 132/77 (!) 149/101 (!) 136/92 (!) 143/100   Resp:       Temp:       TempSrc:       SpO2: 97% 98%     Weight:       Height:           Alert Awake in NAD  HEENT grossly normal  Neck: no lymphadenopathy or thryoidomegaly  Lungs CTAB  Heart RRR  ABD nontender   Pelvic: minimal lochia, no malodor, no abnormal discharge or bright red bleeding  Laceration repair intact, without exposed suture   No suprapubic or uterine tenderness, no adnexal tenderness   EXT:  no edema or calf tenderness  Neuro:  +3 deep tendon reflexes right, +2 on left; 2 beats bilaterally - no  sustained clonus     UA RESULTS:  Recent Labs   Lab Test  12/08/18   1536   COLOR  Yellow   APPEARANCE  Cloudy   URINEGLC  Negative   URINEBILI  Negative   URINEKETONE  Negative   SG  1.028   UBLD  Large*   URINEPH  5.0   PROTEIN  100*   NITRITE  Negative   LEUKEST  Large*   RBCU  148*   WBCU  >182*     Lab Results   Component Value Date    WBC 8.3 12/08/2018     Lab Results   Component Value Date    RBC 3.74 12/08/2018     Lab Results   Component Value Date    HGB 10.5 12/08/2018     Lab Results   Component Value Date    HCT 32.3 12/08/2018     No components found for: MCT  Lab Results   Component Value Date    MCV 86 12/08/2018     Lab Results   Component Value Date    MCH 28.1 12/08/2018     Lab Results   Component Value Date    MCHC 32.5 12/08/2018     Lab Results   Component Value Date    RDW 12.5 12/08/2018     Lab Results   Component Value Date     12/08/2018     Last Comprehensive Metabolic Panel:  Sodium   Date Value Ref Range Status   12/08/2018 142 133 - 144 mmol/L Final     Potassium   Date Value Ref Range Status   12/08/2018 3.8 3.4 - 5.3 mmol/L Final     Chloride   Date Value Ref Range Status   12/08/2018 110 (H) 94 - 109 mmol/L Final     Carbon Dioxide   Date Value Ref Range Status   12/08/2018 25 20 - 32 mmol/L Final     Anion Gap   Date Value Ref Range Status   12/08/2018 7 3 - 14 mmol/L Final     Glucose   Date Value Ref Range Status   12/08/2018 100 (H) 70 - 99 mg/dL Final     Urea Nitrogen   Date Value Ref Range Status   12/08/2018 8 7 - 30 mg/dL Final     Creatinine   Date Value Ref Range Status   12/08/2018 0.73 0.52 - 1.04 mg/dL Final     GFR Estimate   Date Value Ref Range Status   12/08/2018 >90 >60 mL/min/1.7m2 Final     Comment:     Non  GFR Calc     Calcium   Date Value Ref Range Status   12/08/2018 8.2 (L) 8.5 - 10.1 mg/dL Final     Bilirubin Total   Date Value Ref Range Status   12/08/2018 0.1 (L) 0.2 - 1.3 mg/dL Final     Alkaline Phosphatase   Date Value Ref  Range Status   12/08/2018 106 40 - 150 U/L Final     ALT   Date Value Ref Range Status   12/08/2018 53 (H) 0 - 50 U/L Final     AST   Date Value Ref Range Status   12/08/2018 58 (H) 0 - 45 U/L Final       Assessment:  IUP at 37w2d    Preeclampsia with elevation of liver enzymes, mild range blood pressures  Urinary Tract Infection    Vaginal Pain requiring IV pain medications in ED  -- Hgb stable, no evidence for hematoma, normal pelvic exam    Admit for observation  Rocephin 1g IV, await urine culture  Monitor blood pressure q 15 mins x1 hour, q 30 mins for 1 hour, then hourly.   Notify physician for severe range blood pressure, systolic 160, diastolic 110 or signs/symptoms of preeclampsia   Will evaluate for need for oral antihypertensive   Monitor intake and output  Repeat labs in AM  Tylenol and Oxycodone for pain control; hold on Ibuprofen at this time      Aurea Ghotra DO  Dept of OB/GYN  December 8, 2018

## 2018-12-09 VITALS
TEMPERATURE: 98.1 F | RESPIRATION RATE: 18 BRPM | SYSTOLIC BLOOD PRESSURE: 125 MMHG | DIASTOLIC BLOOD PRESSURE: 79 MMHG | HEART RATE: 69 BPM

## 2018-12-09 LAB
ALBUMIN SERPL-MCNC: 2.2 G/DL (ref 3.4–5)
ALP SERPL-CCNC: 107 U/L (ref 40–150)
ALT SERPL W P-5'-P-CCNC: 44 U/L (ref 0–50)
ANION GAP SERPL CALCULATED.3IONS-SCNC: 7 MMOL/L (ref 3–14)
AST SERPL W P-5'-P-CCNC: 31 U/L (ref 0–45)
BACTERIA SPEC CULT: ABNORMAL
BASOPHILS # BLD AUTO: 0 10E9/L (ref 0–0.2)
BASOPHILS NFR BLD AUTO: 0.3 %
BILIRUB SERPL-MCNC: 0.4 MG/DL (ref 0.2–1.3)
BUN SERPL-MCNC: 8 MG/DL (ref 7–30)
CALCIUM SERPL-MCNC: 8.3 MG/DL (ref 8.5–10.1)
CHLORIDE SERPL-SCNC: 107 MMOL/L (ref 94–109)
CO2 SERPL-SCNC: 25 MMOL/L (ref 20–32)
CREAT SERPL-MCNC: 0.72 MG/DL (ref 0.52–1.04)
DIFFERENTIAL METHOD BLD: ABNORMAL
EOSINOPHIL # BLD AUTO: 0.4 10E9/L (ref 0–0.7)
EOSINOPHIL NFR BLD AUTO: 4.1 %
ERYTHROCYTE [DISTWIDTH] IN BLOOD BY AUTOMATED COUNT: 12.4 % (ref 10–15)
ERYTHROCYTE [DISTWIDTH] IN BLOOD BY AUTOMATED COUNT: 12.5 % (ref 10–15)
GFR SERPL CREATININE-BSD FRML MDRD: >90 ML/MIN/1.7M2
GLUCOSE SERPL-MCNC: 78 MG/DL (ref 70–99)
HCT VFR BLD AUTO: 29.3 % (ref 35–47)
HCT VFR BLD AUTO: 30.2 % (ref 35–47)
HGB BLD-MCNC: 9.6 G/DL (ref 11.7–15.7)
HGB BLD-MCNC: 9.6 G/DL (ref 11.7–15.7)
IMM GRANULOCYTES # BLD: 0.1 10E9/L (ref 0–0.4)
IMM GRANULOCYTES NFR BLD: 0.6 %
LYMPHOCYTES # BLD AUTO: 1.1 10E9/L (ref 0.8–5.3)
LYMPHOCYTES NFR BLD AUTO: 12.8 %
Lab: ABNORMAL
MCH RBC QN AUTO: 28 PG (ref 26.5–33)
MCH RBC QN AUTO: 28.7 PG (ref 26.5–33)
MCHC RBC AUTO-ENTMCNC: 31.8 G/DL (ref 31.5–36.5)
MCHC RBC AUTO-ENTMCNC: 32.8 G/DL (ref 31.5–36.5)
MCV RBC AUTO: 88 FL (ref 78–100)
MCV RBC AUTO: 88 FL (ref 78–100)
MONOCYTES # BLD AUTO: 0.4 10E9/L (ref 0–1.3)
MONOCYTES NFR BLD AUTO: 4.2 %
NEUTROPHILS # BLD AUTO: 6.8 10E9/L (ref 1.6–8.3)
NEUTROPHILS NFR BLD AUTO: 78 %
NRBC # BLD AUTO: 0 10*3/UL
NRBC BLD AUTO-RTO: 0 /100
PLATELET # BLD AUTO: 163 10E9/L (ref 150–450)
PLATELET # BLD AUTO: 193 10E9/L (ref 150–450)
POTASSIUM SERPL-SCNC: 3.8 MMOL/L (ref 3.4–5.3)
PROT SERPL-MCNC: 5.7 G/DL (ref 6.8–8.8)
RBC # BLD AUTO: 3.35 10E12/L (ref 3.8–5.2)
RBC # BLD AUTO: 3.43 10E12/L (ref 3.8–5.2)
SODIUM SERPL-SCNC: 139 MMOL/L (ref 133–144)
SPECIMEN SOURCE: ABNORMAL
WBC # BLD AUTO: 8.8 10E9/L (ref 4–11)
WBC # BLD AUTO: 8.9 10E9/L (ref 4–11)

## 2018-12-09 PROCEDURE — 36415 COLL VENOUS BLD VENIPUNCTURE: CPT | Performed by: OBSTETRICS & GYNECOLOGY

## 2018-12-09 PROCEDURE — 25000128 H RX IP 250 OP 636: Performed by: OBSTETRICS & GYNECOLOGY

## 2018-12-09 PROCEDURE — 25000132 ZZH RX MED GY IP 250 OP 250 PS 637: Performed by: OBSTETRICS & GYNECOLOGY

## 2018-12-09 PROCEDURE — 85025 COMPLETE CBC W/AUTO DIFF WBC: CPT | Performed by: OBSTETRICS & GYNECOLOGY

## 2018-12-09 PROCEDURE — 85027 COMPLETE CBC AUTOMATED: CPT | Performed by: OBSTETRICS & GYNECOLOGY

## 2018-12-09 PROCEDURE — 80053 COMPREHEN METABOLIC PANEL: CPT | Performed by: OBSTETRICS & GYNECOLOGY

## 2018-12-09 RX ORDER — AMOXICILLIN 250 MG
2 CAPSULE ORAL 2 TIMES DAILY
Status: DISCONTINUED | OUTPATIENT
Start: 2018-12-09 | End: 2018-12-09 | Stop reason: HOSPADM

## 2018-12-09 RX ORDER — LABETALOL 200 MG/1
200 TABLET, FILM COATED ORAL EVERY 12 HOURS
Qty: 60 TABLET | Refills: 3 | Status: SHIPPED | OUTPATIENT
Start: 2018-12-09 | End: 2019-11-07

## 2018-12-09 RX ORDER — OXYCODONE HYDROCHLORIDE 5 MG/1
5 TABLET ORAL EVERY 4 HOURS PRN
Qty: 20 TABLET | Refills: 0 | Status: SHIPPED | OUTPATIENT
Start: 2018-12-09 | End: 2018-12-12

## 2018-12-09 RX ORDER — CEPHALEXIN 500 MG/1
500 CAPSULE ORAL 2 TIMES DAILY
Qty: 20 CAPSULE | Refills: 0 | Status: SHIPPED | OUTPATIENT
Start: 2018-12-09 | End: 2018-12-19

## 2018-12-09 RX ADMIN — OXYCODONE HYDROCHLORIDE 5 MG: 5 TABLET ORAL at 02:00

## 2018-12-09 RX ADMIN — OXYCODONE HYDROCHLORIDE 5 MG: 5 TABLET ORAL at 06:04

## 2018-12-09 RX ADMIN — OXYCODONE HYDROCHLORIDE 5 MG: 5 TABLET ORAL at 10:02

## 2018-12-09 RX ADMIN — ACETAMINOPHEN 975 MG: 325 TABLET, FILM COATED ORAL at 06:03

## 2018-12-09 RX ADMIN — ACETAMINOPHEN 975 MG: 325 TABLET, FILM COATED ORAL at 12:07

## 2018-12-09 RX ADMIN — OXYCODONE HYDROCHLORIDE 5 MG: 5 TABLET ORAL at 14:00

## 2018-12-09 RX ADMIN — SODIUM CHLORIDE: 9 INJECTION, SOLUTION INTRAVENOUS at 06:58

## 2018-12-09 RX ADMIN — LABETALOL HCL 200 MG: 200 TABLET, FILM COATED ORAL at 07:55

## 2018-12-09 RX ADMIN — SENNOSIDES AND DOCUSATE SODIUM 2 TABLET: 8.6; 5 TABLET ORAL at 11:50

## 2018-12-09 NOTE — PROGRESS NOTES
No CVA tenderness. Suprapubic pain has increased since admission. Afebrile. BPs improved. Plan CBC now.   Will administer IV pain dose x1 and Ambien. Low threshold for imaging if worsening pain.    Aurea Ghotra, DO  OB/GYN

## 2018-12-09 NOTE — DISCHARGE SUMMARY
[facility] Discharge Summary    Sylvia Leonard MRN# [id number]   Age: 23 year old YOB: 1995     Date of Admission:  12/8/2018  Date of Discharge::  12/9/2018  Admitting Physician:  Rickey Dejesus MD  Discharge Physician:  Rickey Dejesus MD     Home clinic: [facility]          Admission Diagnoses:   Re-admitted post vaginal delivery for UTI and gestational hpertension          Discharge Diagnosis:   same          Procedures:   Procedure(s): No additional procedures performed       No other procedures performed during this admission           Medications Prior to Admission:     Medications Prior to Admission   Medication Sig Dispense Refill Last Dose     acetaminophen (TYLENOL) 500 MG tablet Take 1,000 mg by mouth every 12 hours as needed for mild pain (alternating with ibuprofen every 6 hours as needed)   12/8/2018 at Unknown time     ibuprofen (ADVIL/MOTRIN) 200 MG tablet Take 800 mg by mouth every 12 hours as needed for mild pain (alternating with tylenol every 6 hours as needed)   12/8/2018 at Unknown time     Misc. Devices (BREAST PUMP) MISC 1 each as needed 1 each 0 Taking     Prenatal Vit-Fe Fumarate-FA (PRENATAL MULTIVITAMIN PLUS IRON) 27-0.8 MG TABS per tablet Take 1 tablet by mouth daily 100 tablet 3 12/8/2018 at Unknown time     UNABLE TO FIND as needed (soreness) MEDICATION NAME: Nipple cream    at prn             Discharge Medications:     Current Discharge Medication List      START taking these medications    Details   cephALEXin (KEFLEX) 500 MG capsule Take 1 capsule (500 mg) by mouth 2 times daily for 10 days  Qty: 20 capsule, Refills: 0    Associated Diagnoses: Dysuria      labetalol (NORMODYNE) 200 MG tablet Take 1 tablet (200 mg) by mouth every 12 hours  Qty: 60 tablet, Refills: 3    Associated Diagnoses: Gestational hypertension, antepartum      oxyCODONE (ROXICODONE) 5 MG tablet Take 1 tablet (5 mg) by mouth every 4 hours as needed for moderate to  severe pain  Qty: 20 tablet, Refills: 0    Associated Diagnoses: Dysuria; Vaginal delivery         CONTINUE these medications which have NOT CHANGED    Details   acetaminophen (TYLENOL) 500 MG tablet Take 1,000 mg by mouth every 12 hours as needed for mild pain (alternating with ibuprofen every 6 hours as needed)      ibuprofen (ADVIL/MOTRIN) 200 MG tablet Take 800 mg by mouth every 12 hours as needed for mild pain (alternating with tylenol every 6 hours as needed)      Misc. Devices (BREAST PUMP) MISC 1 each as needed  Qty: 1 each, Refills: 0    Associated Diagnoses: Supervision of other normal pregnancy, antepartum      Prenatal Vit-Fe Fumarate-FA (PRENATAL MULTIVITAMIN PLUS IRON) 27-0.8 MG TABS per tablet Take 1 tablet by mouth daily  Qty: 100 tablet, Refills: 3      UNABLE TO FIND as needed (soreness) MEDICATION NAME: Nipple cream                   Consultations:   No consultations were requested during this admission          Brief History of Admission   Re-admitted for intense pelvic vaginal pain post delivery; determined to be secondary to UTI.  Found also to have elevated BP; initial PIH labs showed mildly elevated LFT, however, PIH labs normalized within 12 hours. BP normalized with labetalol.           Hospital Course:   The patient's hospital course was of note as reviewed above    Post-partum hemoglobin:   Hemoglobin   Date Value Ref Range Status   12/09/2018 9.6 (L) 11.7 - 15.7 g/dL Final             Discharge Instructions and Follow-Up:   Discharge diet: Regular   Discharge activity: Activity as tolerated   Discharge follow-up: Follow up with primary care provider in 1 days   Wound care: Drink plenty of fluids           Discharge Disposition:   Discharged to home      Attestation:  I have reviewed today's vital signs, notes, medications, labs and imaging.    Rickey Dejesus MD

## 2018-12-09 NOTE — PLAN OF CARE
Assessment completed OOB to void 900cc reports 2/10 for pain related to suprapubic with slight burning with urination.  Requesting for Vidya MAXWELL on phone but is on his way in.  Will discuss this upon arrival.

## 2018-12-09 NOTE — PROGRESS NOTES
Data: Vital signs within normal limits. Postpartum checks within normal limits - see flow record. Patient eating and drinking normally. Patient able to empty bladder independently and is up ambulating.Will continue taking PO antibiotic for UTI at discharge. Prescribed Oxycodone prn to take for breakthrough pain. Will continue to take Labetalol PO and have BP check in office tomorrow.  Action: Patient medicated during the shift for pain. See MAR. Patient reassessed within 1 hour after each medication and pain was improved - patient stated she was comfortable. Patient education done. See flow record.  Response:  Support persons present.   Plan: Anticipate discharge now.

## 2018-12-09 NOTE — DISCHARGE INSTRUCTIONS
Urinary Tract Infections in Women    Urinary tract infections (UTIs) are most often caused by bacteria. These bacteria enter the urinary tract. The bacteria may come from outside the body. Or they may travel from the skin outside the rectum or vagina into the urethra. Female anatomy makes it easy for bacteria from the bowel to enter a woman s urinary tract, which is the most common source of UTI. This means women develop UTIs more often than men. Pain in or around the urinary tract is a common UTI symptom. But the only way to know for sure if you have a UTI for the healthcare provider to test your urine. The two tests that may be done are the urinalysis and urine culture.  Types of UTIs    Cystitis. A bladder infection (cystitis) is the most common UTI in women. You may have urgent or frequent urination. You may also have pain, burning when you urinate, and bloody urine.    Urethritis. This is an inflamed urethra, which is the tube that carries urine from the bladder to outside the body. You may have lower stomach or back pain. You may also have urgent or frequent urination.    Pyelonephritis. This is a kidney infection. If not treated, it can be serious and damage your kidneys. In severe cases, you may need to stay in the hospital. You may have a fever and lower back pain.  Medicines to treat a UTI  Most UTIs are treated with antibiotics. These kill the bacteria. The length of time you need to take them depends on the type of infection. It may be as short as 3 days. If you have repeated UTIs, you may need a low-dose antibiotic for several months. Take antibiotics exactly as directed. Don t stop taking them until all of the medicine is gone. If you stop taking the antibiotic too soon, the infection may not go away. You may also develop a resistance to the antibiotic. This can make it much harder to treat.  Lifestyle changes to treat and prevent UTIs  The lifestyle changes below will help get rid of your UTI. They  may also help prevent future UTIs.    Drink plenty of fluids. This includes water, juice, or other caffeine-free drinks. Fluids help flush bacteria out of your body.    Empty your bladder. Always empty your bladder when you feel the urge to urinate. And always urinate before going to sleep. Urine that stays in your bladder can lead to infection. Try to urinate before and after sex as well.    Practice good personal hygiene. Wipe yourself from front to back after using the toilet. This helps keep bacteria from getting into the urethra.    Use condoms during sex. These help prevent UTIs caused by sexually transmitted bacteria. Also don't use spermicides during sex. These can increase the risk for UTIs. Choose other forms of birth control instead. For women who tend to get UTIs after sex, a low-dose of a preventive antibiotic may be used. Be sure to discuss this option with your healthcare provider.    Follow up with your healthcare provider as directed. He or she may test to make sure the infection has cleared. If needed, more treatment may be started.  Date Last Reviewed: 1/1/2017 2000-2018 [employer]. [address] [place] [id number]. All rights reserved. This information is not intended as a substitute for professional medical care. Always follow your healthcare professional's instructions.

## 2018-12-09 NOTE — PROGRESS NOTES
Patient's blood pressures are high mild range, 140s-150s/90s-low 100s. Plan Labatalol 200mg PO BID. Call for severe range blood pressures 160 systolic, 110 diastolic.    Aurea Ghotra, DO  OB/GYN'

## 2018-12-09 NOTE — PROVIDER NOTIFICATION
12/08/18 2532   Provider Notification   Provider Name/Title Dr. Ghotra   Method of Notification At Bedside   Request Evaluate in Person   Notification Reason Pain   Patient requesting something stronger for pain.  Orders received for CBC, fentanyl, senna and ambien.

## 2018-12-09 NOTE — PROVIDER NOTIFICATION
12/09/18 1215   Provider Notification   Provider Name/Title Dr. Dejesus   Method of Notification In Department   Request Evaluate-Remote   Notification Reason Status Update   Patient may discharge after lunch if BP stable and patient feeling well to go home. Anticipate to discharge on oral antibiotic and Labetalol. Will follow up in OB office this week.

## 2018-12-09 NOTE — PHARMACY-ADMISSION MEDICATION HISTORY
Admission medication history interview status for this patient is complete. See [facility] admission navigator for allergy information, prior to admission medications and immunization status.     Medication history interview source(s):Patient  Medication history resources (including written lists, pill bottles, clinic record):None    Changes made to PTA medication list:  Added: cream  Deleted: Prilosec  Changed: directions of tylenol and ibuprofen    Actions taken by pharmacist (provider contacted, etc):None     Additional medication history information:None    Medication reconciliation/reorder completed by provider prior to medication history? Yes    Do you take OTC medications (eg tylenol, ibuprofen, fish oil, eye/ear drops, etc)? Y(Y/N)    For patients on insulin therapy: N (Y/N)    Time spent in this activity: 5 min    Prior to Admission medications    Medication Sig Last Dose Taking? Auth Provider   acetaminophen (TYLENOL) 500 MG tablet Take 1,000 mg by mouth every 12 hours as needed for mild pain (alternating with ibuprofen every 6 hours as needed) 12/8/2018 at Unknown time Yes Unknown, Entered By History   ibuprofen (ADVIL/MOTRIN) 200 MG tablet Take 800 mg by mouth every 12 hours as needed for mild pain (alternating with tylenol every 6 hours as needed) 12/8/2018 at Unknown time Yes Unknown, Entered By History   Prenatal Vit-Fe Fumarate-FA (PRENATAL MULTIVITAMIN PLUS IRON) 27-0.8 MG TABS per tablet Take 1 tablet by mouth daily 12/8/2018 at Unknown time Yes Aamda Krause MD   UNABLE TO FIND as needed (soreness) MEDICATION NAME: Nipple cream  at prn Yes Unknown, Entered By History   Misc. Devices (BREAST PUMP) MISC 1 each as needed   Amada Krause MD

## 2018-12-10 ENCOUNTER — ALLIED HEALTH/NURSE VISIT (OUTPATIENT)
Dept: NURSING | Facility: CLINIC | Age: 23
End: 2018-12-10
Payer: COMMERCIAL

## 2018-12-10 VITALS — SYSTOLIC BLOOD PRESSURE: 132 MMHG | DIASTOLIC BLOOD PRESSURE: 82 MMHG

## 2018-12-10 DIAGNOSIS — Z01.30 BLOOD PRESSURE CHECK: Primary | ICD-10-CM

## 2018-12-10 PROBLEM — Z34.80 SUPERVISION OF OTHER NORMAL PREGNANCY, ANTEPARTUM: Status: RESOLVED | Noted: 2018-05-29 | Resolved: 2018-12-10

## 2018-12-10 PROCEDURE — 99207 ZZC NO CHARGE NURSE ONLY: CPT

## 2018-12-10 NOTE — NURSING NOTE
Patient is here for a nurse only blood pressure check.     132/82- BP  Patient states she was put on Lobetalol by Dr. Dejesus. Conferred with Dr. Dejesus that it was an ok BP. He would like her to follow up 1 week with Dr. Krause.    Mike Dukes, [facility]

## 2018-12-19 ENCOUNTER — HOSPITAL ENCOUNTER (OUTPATIENT)
Facility: CLINIC | Age: 23
End: 2018-12-19
Admitting: OBSTETRICS & GYNECOLOGY
Payer: COMMERCIAL

## 2018-12-21 ENCOUNTER — PRENATAL OFFICE VISIT (OUTPATIENT)
Dept: OBGYN | Facility: CLINIC | Age: 23
End: 2018-12-21
Payer: COMMERCIAL

## 2018-12-21 VITALS — WEIGHT: 146 LBS | SYSTOLIC BLOOD PRESSURE: 128 MMHG | BODY MASS INDEX: 25.06 KG/M2 | DIASTOLIC BLOOD PRESSURE: 78 MMHG

## 2018-12-21 PROCEDURE — 99213 OFFICE O/P EST LOW 20 MIN: CPT | Mod: 24 | Performed by: OBSTETRICS & GYNECOLOGY

## 2018-12-21 NOTE — PROGRESS NOTES
SUBJECTIVE:                                                   Sylvia Leonard is a 23 year old female who presents to clinic today to follow up for hypertension after IOL for preeclampsia. Was discharged from the hospital with labetalol 200 mg po bid, which she continues to take. Feels well, no concerns. Baby is doing well.    Problem list and histories reviewed & adjusted, as indicated.  Additional history: as documented.    Patient Active Problem List   Diagnosis     Indication for care in labor or delivery     Vaginal delivery     Past Surgical History:   Procedure Laterality Date     NO HISTORY OF SURGERY       wisdom teeth  2010      Social History     Tobacco Use     Smoking status: Never Smoker     Smokeless tobacco: Never Used   Substance Use Topics     Alcohol use: No      Problem (# of Occurrences) Relation (Name,Age of Onset)    Cerebrovascular Disease (1) Maternal Grandmother    Hyperlipidemia (1) Mother    Hypertension (1) Mother              Current Outpatient Medications on File Prior to Visit:  acetaminophen (TYLENOL) 500 MG tablet Take 1,000 mg by mouth every 12 hours as needed for mild pain (alternating with ibuprofen every 6 hours as needed)   ibuprofen (ADVIL/MOTRIN) 200 MG tablet Take 800 mg by mouth every 12 hours as needed for mild pain (alternating with tylenol every 6 hours as needed)   labetalol (NORMODYNE) 200 MG tablet Take 1 tablet (200 mg) by mouth every 12 hours   Misc. Devices (BREAST PUMP) MISC 1 each as needed   Prenatal Vit-Fe Fumarate-FA (PRENATAL MULTIVITAMIN PLUS IRON) 27-0.8 MG TABS per tablet Take 1 tablet by mouth daily   UNABLE TO FIND as needed (soreness) MEDICATION NAME: Nipple cream     No current facility-administered medications on file prior to visit.   No Known Allergies    ROS:  5 point ROS negative except as noted above in HPI, including Gen., Resp., CV, GI &  system review.    OBJECTIVE:     No exam was necessary today as this was entirely a counseling  appointment.    In-Clinic Test Results:  No results found for this or any previous visit (from the past 24 hour(s)).    ASSESSMENT/PLAN:                                                        ICD-10-CM    1. Postpartum hypertension O16.5          Her blood pressure is normal and stable, and she feels well on this dose of labetalol without side effects. Plan to continue through her 6 week postpartum visit then can wean if doing well. Follow up with me for her postpartum visit as scheduled, or sooner if any problems arise.    Amada Krause MD  [facility]

## 2018-12-21 NOTE — NURSING NOTE
"Chief Complaint   Patient presents with     Postpartum Care     2wk PP visit for BP check. Taking BP med twice daily.        Initial /78   Wt 66.2 kg (146 lb)   Breastfeeding? Yes   BMI 25.06 kg/m   Estimated body mass index is 25.06 kg/m  as calculated from the following:    Height as of 18: 1.626 m (5' 4\").    Weight as of this encounter: 66.2 kg (146 lb).  BP completed using cuff size: regular    Questioned patient about current smoking habits.  Pt. has never smoked.          Zehra Jain LPN             "

## 2019-01-17 ENCOUNTER — PRENATAL OFFICE VISIT (OUTPATIENT)
Dept: OBGYN | Facility: CLINIC | Age: 24
End: 2019-01-17
Payer: COMMERCIAL

## 2019-01-17 VITALS
DIASTOLIC BLOOD PRESSURE: 70 MMHG | HEART RATE: 76 BPM | BODY MASS INDEX: 24.37 KG/M2 | SYSTOLIC BLOOD PRESSURE: 120 MMHG | WEIGHT: 142 LBS

## 2019-01-17 PROCEDURE — 99207 ZZC POST PARTUM EXAM: CPT | Performed by: OBSTETRICS & GYNECOLOGY

## 2019-01-17 RX ORDER — ACETAMINOPHEN AND CODEINE PHOSPHATE 120; 12 MG/5ML; MG/5ML
0.35 SOLUTION ORAL DAILY
Qty: 112 TABLET | Refills: 3 | Status: SHIPPED | OUTPATIENT
Start: 2019-01-17 | End: 2019-11-07

## 2019-01-17 ASSESSMENT — PATIENT HEALTH QUESTIONNAIRE - PHQ9: SUM OF ALL RESPONSES TO PHQ QUESTIONS 1-9: 5

## 2019-01-17 NOTE — NURSING NOTE
"Chief Complaint   Patient presents with     Postpartum Care     6wk PP visit. Pap up to date.        Initial /70   Pulse 76   Wt 64.4 kg (142 lb)   Breastfeeding? Yes   BMI 24.37 kg/m   Estimated body mass index is 24.37 kg/m  as calculated from the following:    Height as of 18: 1.626 m (5' 4\").    Weight as of this encounter: 64.4 kg (142 lb).  BP completed using cuff size: regular    Questioned patient about current smoking habits.  Pt. has never smoked.          The following HM Due: NONE      Zehra Jain LPN               "

## 2019-01-17 NOTE — PROGRESS NOTES
Sylvia is here for a 6-week postpartum checkup.    She had a  of a viable girl, weight 7 pounds 1 oz., with none complications. Date of delivery was 18. Since delivery, she has been breast feeding.  She has no signs of infection, bleeding or other complications.  She is not pregnant.  We discussed contraceptions and she has chosen unsure.      Post partum tubal: No  History of Gestational Diabetes? No  Type of Delivery:  Vaginal  Feeding Method:  Pumping  If initiated breast feeding and stopped, how long did you breast feed?:  Not applicable    REVIEW OF SYSTEMS:  All negative.  Contraception Plan: mini pill / progesterone only pill    Medical History Reviewed yes -   Family History Reviewed yes -   Problem List Updated no    EXAM:  /70   Pulse 76   Wt 64.4 kg (142 lb)   Breastfeeding? Yes   BMI 24.37 kg/m    HEENT: grossly normal.  ABDOMEN: soft, non tender, good bowel sounds, without masses rebound, guarding or tenderness.  PELVIC:  External genitalia; normal without lesion, repair well healed.   Vagina: normal mucosa and rugae, no discharge. Adnexa: non tender, without masses.  EXTREMITIES:  warm to touch, good pulses, no ankle edema or calf tenderness.  NEUROLOGIC: grossly normal.    ASSESSMENT:   6-week postpartum exam after .    PLAN:    Contraception methods discussed  Exercise encouraged  Follow up in 1 year.  One-hour glucose tolerance test needed? No  Mini pill / progesterone only pill for contraception. Discussed the differences between the progesterone-only pill and the combination oral contraceptive pills. Specifically, discussed taking this at the same time every day (within a 3 hour window), using back up contraception for 2-4 weeks after starting, using back up contraception for one week if she misses or is late taking a pill, and that there are no placebo pills in the pack. She states her understanding.    Amada Krause MD

## 2019-02-25 ENCOUNTER — OFFICE VISIT (OUTPATIENT)
Dept: URGENT CARE | Facility: URGENT CARE | Age: 24
End: 2019-02-25
Payer: COMMERCIAL

## 2019-02-25 DIAGNOSIS — J11.1 INFLUENZA-LIKE ILLNESS: ICD-10-CM

## 2019-02-25 DIAGNOSIS — R07.0 THROAT PAIN: Primary | ICD-10-CM

## 2019-02-25 DIAGNOSIS — R50.9 FEVER AND CHILLS: ICD-10-CM

## 2019-02-25 LAB
ALBUMIN UR-MCNC: NEGATIVE MG/DL
APPEARANCE UR: CLEAR
BILIRUB UR QL STRIP: NEGATIVE
COLOR UR AUTO: YELLOW
DEPRECATED S PYO AG THROAT QL EIA: NORMAL
FLUAV+FLUBV AG SPEC QL: NEGATIVE
FLUAV+FLUBV AG SPEC QL: NEGATIVE
GLUCOSE UR STRIP-MCNC: NEGATIVE MG/DL
HGB UR QL STRIP: NEGATIVE
KETONES UR STRIP-MCNC: NEGATIVE MG/DL
LEUKOCYTE ESTERASE UR QL STRIP: NEGATIVE
NITRATE UR QL: NEGATIVE
PH UR STRIP: 5.5 PH (ref 5–7)
SOURCE: NORMAL
SP GR UR STRIP: 1.01 (ref 1–1.03)
SPECIMEN SOURCE: NORMAL
SPECIMEN SOURCE: NORMAL
UROBILINOGEN UR STRIP-ACNC: 0.2 EU/DL (ref 0.2–1)

## 2019-02-25 PROCEDURE — 87081 CULTURE SCREEN ONLY: CPT | Performed by: FAMILY MEDICINE

## 2019-02-25 PROCEDURE — 99204 OFFICE O/P NEW MOD 45 MIN: CPT | Performed by: FAMILY MEDICINE

## 2019-02-25 PROCEDURE — 81003 URINALYSIS AUTO W/O SCOPE: CPT | Performed by: FAMILY MEDICINE

## 2019-02-25 PROCEDURE — 87804 INFLUENZA ASSAY W/OPTIC: CPT | Performed by: FAMILY MEDICINE

## 2019-02-25 PROCEDURE — 87880 STREP A ASSAY W/OPTIC: CPT | Performed by: FAMILY MEDICINE

## 2019-02-25 RX ORDER — OSELTAMIVIR PHOSPHATE 75 MG/1
75 CAPSULE ORAL 2 TIMES DAILY
Qty: 10 CAPSULE | Refills: 0 | Status: SHIPPED | OUTPATIENT
Start: 2019-02-25 | End: 2019-03-02

## 2019-02-25 NOTE — PATIENT INSTRUCTIONS
Patient Education     Influenza (Adult)    Influenza is also called the flu. It is a viral illness that affects the air passages of your lungs. It is different from the common cold. The flu can easily be passed from one to person to another. It may be spread through the air by coughing and sneezing. Or it can be spread by touching the sick person and then touching your own eyes, nose, or mouth.  The flu starts 1 to 3 days after you are exposed to the flu virus. It may last for 1 to 2 weeks but many people feel tired or fatigued for many weeks afterward. You usually don t need to take antibiotics unless you have a complication. This might be an ear or sinus infection or pneumonia.  Symptoms of the flu may be mild or severe. They can include extreme tiredness (wanting to stay in bed all day), chills, fevers, muscle aches, soreness with eye movement, headache, and a dry, hacking cough.  Home care  Follow these guidelines when caring for yourself at home:    Avoid being around cigarette smoke, whether yours or other people s.    Acetaminophen or ibuprofen will help ease your fever, muscle aches, and headache. Don t give aspirin to anyone younger than 18 who has the flu. Aspirin can harm the liver.    Nausea and loss of appetite are common with the flu. Eat light meals. Drink 6 to 8 glasses of liquids every day. Good choices are water, sport drinks, soft drinks without caffeine, juices, tea, and soup. Extra fluids will also help loosen secretions in your nose and lungs.    Over-the-counter cold medicines will not make the flu go away faster. But the medicines may help with coughing, sore throat, and congestion in your nose and sinuses. Don t use a decongestant if you have high blood pressure.    Stay home until your fever has been gone for at least 24 hours without using medicine to reduce fever.  Follow-up care  Follow up with your healthcare provider, or as advised, if you are not getting better over the next  week.  If you are age 65 or older, talk with your provider about getting a pneumococcal vaccine every 5 years. You should also get this vaccine if you have chronic asthma or COPD. All adults should get a flu vaccine every fall. Ask your provider about this.  When to seek medical advice  Call your healthcare provider right away if any of these occur:    Cough with lots of colored mucus (sputum) or blood in your mucus    Chest pain, shortness of breath, wheezing, or trouble breathing    Severe headache, or face, neck, or ear pain    New rash with fever    Fever of 100.4 F (38 C) or higher, or as directed by your healthcare provider    Confusion, behavior change, or seizure    Severe weakness or dizziness    You get a new fever or cough after getting better for a few days  Date Last Reviewed: 1/1/2017 2000-2018 [employer]. [address] [place] [address], [place] [address]. All rights reserved. This information is not intended as a substitute for professional medical care. Always follow your healthcare professional's instructions.

## 2019-02-26 VITALS
DIASTOLIC BLOOD PRESSURE: 80 MMHG | TEMPERATURE: 98.3 F | SYSTOLIC BLOOD PRESSURE: 140 MMHG | OXYGEN SATURATION: 99 % | HEART RATE: 106 BPM

## 2019-02-26 LAB
BACTERIA SPEC CULT: NORMAL
SPECIMEN SOURCE: NORMAL

## 2019-02-26 NOTE — PROGRESS NOTES
SUBJECTIVE:   Chief Complaint   Patient presents with     Urgent Care     URI     Sore throat, lightheadedness, Sinus pressure pain, pressure behind bilateral eye, low grade fever. Sx x3 days     Sylvia Leonard is a 24 year old female who present complaining of flu-like symptoms: fevers, chills, myalgias, headache,  congestion, sore throat   for 1/2 days. Denies   dyspnea /  wheezing.   Has had mild sinus congestion and sore throat for 3 days.  Has no known exposure to people with influenza.  She is 2.5 months post partum.  She was standing from bed this morning, holding her child, and she felt weak and fell back on the bed,  No trauma to her body/ extremities,  Did not strike her head,  No LOC    She had pre-ecclampsia at delivery-  This was treated,  At her post-partum follow-up BP normal and no symptoms.  She is wondering if she can have pre-ecclampsia 2.5 months after birth          Past Medical History:   Diagnosis Date     Hypertension     1st pregnancy     Migraine      Patient Active Problem List   Diagnosis     Indication for care in labor or delivery     Vaginal delivery       ALLERGIES:  Patient has no known allergies.        Current Outpatient Medications on File Prior to Visit:  acetaminophen (TYLENOL) 500 MG tablet Take 1,000 mg by mouth every 12 hours as needed for mild pain (alternating with ibuprofen every 6 hours as needed)   ibuprofen (ADVIL/MOTRIN) 200 MG tablet Take 800 mg by mouth every 12 hours as needed for mild pain (alternating with tylenol every 6 hours as needed)   norethindrone (MICRONOR) 0.35 MG tablet Take 1 tablet (0.35 mg) by mouth daily   Prenatal Vit-Fe Fumarate-FA (PRENATAL MULTIVITAMIN PLUS IRON) 27-0.8 MG TABS per tablet Take 1 tablet by mouth daily   [] cephALEXin (KEFLEX) 500 MG capsule Take 1 capsule (500 mg) by mouth 2 times daily for 10 days   labetalol (NORMODYNE) 200 MG tablet Take 1 tablet (200 mg) by mouth every 12 hours (Patient not taking: Reported on 2019)    Misc. Devices (BREAST PUMP) MISC 1 each as needed   [] oxyCODONE (ROXICODONE) 5 MG tablet Take 1 tablet (5 mg) by mouth every 4 hours as needed for moderate to severe pain   UNABLE TO FIND as needed (soreness) MEDICATION NAME: Nipple cream     No current facility-administered medications on file prior to visit.     Social History     Tobacco Use     Smoking status: Never Smoker     Smokeless tobacco: Never Used   Substance Use Topics     Alcohol use: No       Family History   Problem Relation Age of Onset     Hypertension Mother      Hyperlipidemia Mother      Cerebrovascular Disease Maternal Grandmother        Review Of Systems    Constitutional:   fevers, chills, fatigue  Skin: negative for rash or lesions  Eyes: negative for eye pain, visual changes  Ears/Nose/Throat: negative for earache  , sinus stuffiness,  sore throat  Respiratory: No shortness of breath, dyspnea on exertion  , occasional cough  Cardiovascular: negative for, palpitations, tachycardia and chest pain  Gastrointestinal: negative for vomiting, abdominal pain and diarrhea  Genitourinary: negative for dysuria and hematuria  Back: negative for pain with back movement,  CVA pain  Musculoskeletal:   generalized joint/ muscle pain, no joint swelling Neurologic:   generalized mild weakness , no noted incoordination  Endocrine: negative for thyroid disorder and diabetes        OBJECTIVE  :BP (!) 150/100 (BP Location: Right arm, Patient Position: Chair, Cuff Size: Adult Regular)   Pulse 106   Temp 98.3  F (36.8  C) (Oral)   SpO2 99%    BP recheck 140/80  GENERAL APPEARANCE: alert, moderate distress, flushed and fatigued,  no cough  EYES: EOMI,  PERRL, conjunctiva clear  HENT: ear canals and TM's normal.  Nose and mouth without ulcers, erythema or lesions  NECK: supple, nontender, no lymphadenopathy  RESP: lungs clear to auscultation - no rales, rhonchi or wheezes  CV: regular rates and rhythm, normal S1 S2, no murmur noted  NEURO: Normal  strength and tone, sensory exam grossly normal,  normal speech and mentation  SKIN: no suspicious lesions or rashes  ABD: no pain with palpation, no masses    Results for orders placed or performed in visit on 02/25/19   *UA reflex to Microscopic and Culture ([place] and [facility] (except [place] and [place])   Result Value Ref [facility]    Color Urine Yellow     Appearance Urine Clear     Glucose Urine Negative NEG^Negative mg/dL    Bilirubin Urine Negative NEG^Negative    Ketones Urine Negative NEG^Negative mg/dL    Specific Gravity Urine 1.015 1.003 - 1.035    Blood Urine Negative NEG^Negative    pH Urine 5.5 5.0 - 7.0 pH    Protein Albumin Urine Negative NEG^Negative mg/dL    Urobilinogen Urine 0.2 0.2 - 1.0 EU/dL    Nitrite Urine Negative NEG^Negative    Leukocyte Esterase Urine Negative NEG^Negative    Source Midstream Urine    Rapid strep screen   Result Value Ref [facility]    Specimen Description Throat     Rapid Strep A Screen       NEGATIVE: No Group A streptococcal antigen detected by immunoassay, await culture report.   Influenza A/B antigen   Result Value Ref [facility]    Influenza A/B Agn Specimen Nasal     Influenza A Negative NEG^Negative    Influenza B Negative NEG^Negative          ASSESSMENT:  Throat pain     - Rapid strep screen  - Influenza A/B antigen  - Beta strep group A culture    Influenza-like illness     - oseltamivir (TAMIFLU) 75 MG capsule; Take 1 capsule (75 mg) by mouth 2 times daily for 5 days May give suspension as alternative    We discussed the limitations of influenza testing and limitations of  antiviral therapy against influenza, that treatment should usually be initiated within 2 days of the start of symptoms and is most critical for persons with weak immunity and chronic respiratory illnesses    Patient expressed desire to take Tamiflu, though testing was negative for influenza     Symptomatic therapy suggested:  Rest, drink lots of fluids,  Acetaminophen / ibuprofen for body  aches and fever,  Salt water gargles for sore throat,  OTC anesthetic lozenges for sore throat,  OTC cough medications.   Call or return to clinic prn if these symptoms worsen or fail to improve as anticipated.    Treatment and prophylaxis for close contacts  was discussed  Advised that influenza illness usually lasts a week, sometimes more and that the patient should avoid contact with others, and cover the mouth when coughing to limit spread of the infection.    Discussed that influenza and similar illnesses are  potent viruses  that can cause damage to airways making increased risk for developing bronchitis or pneumonia.  In severe cases of chest symptoms an  antibiotic can treat the bacterial superinfection, but I explained that the antibiotic is not effective against the influenza or other respiratory viruses.         Fever and chills     - *UA reflex to Microscopic and Culture ([facility] and [facility] (except [place] and [place])     discussed other common causes of fever/ chills-  No signs of UTI/ pyelonephritis,  No labored breathing, no pulmonary crackles, typical of pneumonia,  No abdominal pain,  No skin infection      Breast feeding status of mother     Discussed the safety profile of tamiflu in breast milk and in treating influenza in infants,    Discussed that preecclampsia is not expected at 2.5 months after birth, her BP was in the adult normal range,  Urine without proteinuria

## 2019-02-27 ENCOUNTER — VIRTUAL VISIT (OUTPATIENT)
Dept: FAMILY MEDICINE | Facility: OTHER | Age: 24
End: 2019-02-27

## 2019-02-27 NOTE — PROGRESS NOTES
"Date:   Clinician: Thu Kenney  Clinician NPI: [id number]  Patient: Slyvia Leonard  Patient : 1995  Patient Address: [address] [place], [place] [id number]  Patient Phone: [phone]  Visit Protocol: URI  Patient Summary:  Sylvia is a 24 year old ( : 1995 ) female who initiated a Visit for cold, sinus infection, or influenza. When asked the question \"Please sign me up to receive news, health information and promotions from [facility].\", Sylvia responded \"No\".    Sylvia states her symptoms started gradually 3-6 days ago.   Her symptoms consist of malaise, facial pain or pressure, rhinitis, nasal congestion, a headache, a sore throat, and ear pain. She is experiencing difficulty breathing due to nasal congestion but she is not short of breath. Sylvia also feels feverish but was unable to measure her temperature.   Symptom details     Nasal secretions: The color of her mucus is white.    Sore throat: Sylvia reports having mild throat pain (1-3 on a 10 point pain scale), does not have exudate on her tonsils, and can swallow liquids. The lymph nodes in her neck are not enlarged. A rash has not appeared on the skin since the sore throat started.     Facial pain or pressure: The facial pain or pressure feels worse when bending over or leaning forward.     Headache: She states the headache is moderate (4-6 on a 10 point pain scale).      Sylvia denies having cough, chills, wheezing, teeth pain, myalgias, and enlarged lymph nodes. She also denies double sickening (worsening symptoms after initial improvement), having a sinus infection within the past year, taking antibiotic medication for the symptoms, and having recent facial or sinus surgery in the past 60 days.   Within the past week, Sylvia has not been exposed to someone with strep throat.   Weight: 140 lbs   Sylvia does not smoke or use smokeless tobacco.   She denies pregnancy and denies breastfeeding. She does not menstruate.   " Additional information as reported by the patient (free text): I had a baby December 5th and just stopped breastfeeding which is why I haven't had a menstrual cycle yet.   MEDICATIONS: Advil oral, Sudafed 12 Hour oral, norethindrone-ethinyl estradiol oral, ALLERGIES: NKDA  Clinician Response:  Dear Sylvia,  Based on the information provided, you have a viral upper respiratory infection, otherwise known as a cold. Symptoms vary from person to person, but can include sneezing, coughing, a runny nose, sore throat, and headache and range from mild to severe.  Unfortunately, there are no medications that can cure a cold, so treatment is focused on controlling symptoms as much as possible. Most people gradually feel better until symptoms are gone in 1-2 weeks.  Medication information  Because you have a viral infection, antibiotics will not help you get better. Treating a viral infection with antibiotics could actually make you feel worse.  I am prescribing:     Fluticasone 50 mcg/actuation nasal spray. Inhale 2 sprays in each nostril 1 time per day; after 1 week, may adjust to 1 - 2 sprays in each nostril 1 time per day. This medication takes several days to start working, so keep taking it even if it doesn't help right away. There are no refills with this prescription.   Self care  The following tips will keep you as comfortable as possible while you recover:     Rest    Drink plenty of water and other liquids    Take a hot shower to loosen congestion    Use throat lozenges    Gargle with warm salt water (1/4 teaspoon of salt per 8 ounce glass of water)    Suck on frozen items such as popsicles or ice cubes    Drink hot tea with lemon and honey     When to seek care  Please be seen in a clinic or urgent care if new symptoms develop, or symptoms become worse.  Call 911 or go to the emergency room if you feel that your throat is closing off, you suddenly develop a rash, you are unable to swallow fluids, you are drooling,  or you are having difficulty breathing.   Diagnosis: Viral URI  Diagnosis ICD: J06.9  Prescription: fluticasone 50 mcg/actuation nasal spray,suspension 1 120 spray aerosol with adapter (grams), 30 days supply. Inhale 2 sprays in each nostril 1 time per day; after 1 week, may adjust to 1 - 2 sprays in each nostril 1 time per day.. Refills: 0, Refill as needed: no, Allow substitutions: yes

## 2019-11-07 ENCOUNTER — OFFICE VISIT (OUTPATIENT)
Dept: OBGYN | Facility: CLINIC | Age: 24
End: 2019-11-07
Payer: COMMERCIAL

## 2019-11-07 VITALS
HEIGHT: 64 IN | DIASTOLIC BLOOD PRESSURE: 70 MMHG | BODY MASS INDEX: 21.41 KG/M2 | SYSTOLIC BLOOD PRESSURE: 124 MMHG | WEIGHT: 125.4 LBS

## 2019-11-07 DIAGNOSIS — Z00.00 ANNUAL PHYSICAL EXAM: Primary | ICD-10-CM

## 2019-11-07 PROCEDURE — 99395 PREV VISIT EST AGE 18-39: CPT | Performed by: OBSTETRICS & GYNECOLOGY

## 2019-11-07 ASSESSMENT — MIFFLIN-ST. JEOR: SCORE: 1293.81

## 2019-11-07 NOTE — PROGRESS NOTES
SUBJECTIVE:                                                      Sylvia is a 24 year old  female who presents for annual exam.     Patient's last menstrual period was 10/21/2019.. Menses are regular q 28-30 days and normal.  Using none for contraception.  She is currently considering pregnancy. Taking a prenatal vitamin. Her daughter is almost 1.  Besides routine health maintenance,  she would like to discuss a few episodes of pain with intercourse. These happened over the last few months, nothing now. Superficial, sharp to burning, short-lived.  Resolved with epsom salt baths. No other treatments tried. No deep pelvic pain, no significant symptoms after sex.  GYNECOLOGIC HISTORY:    Sylvia is sexually active with 1 male partner(s) and is currently in a monogamous relationship.      History sexually transmitted infections:No STD history    History of abnormal Pap smear: NO - age 30- 65 PAP every 3 years recommended  Family history of breast CA: No  Family history of uterine/ovarian CA: No    Family history of colon CA: No      HISTORY:  OB History    Para Term  AB Living   3 1 1 0 1 1   SAB TAB Ectopic Multiple Live Births   0 0 0 0 1      # Outcome Date GA Lbr Jose/2nd Weight Sex Delivery Anes PTL Lv   3             2 Term 18 37w2d 05:13 / 04:43 3.23 kg (7 lb 1.9 oz) F Vag-Spont EPI  SEAMUS      Complications: Preeclampsia/Hypertension      Name: BENNIE,BABYRegine MAHAN      Apgar1: 9  Apgar5: 9   1 AB 10/2013 5w0d             Obstetric Comments   Preeclampsia/HTN     Past Medical History:   Diagnosis Date     Hypertension     1st pregnancy     Migraine      Past Surgical History:   Procedure Laterality Date     wisdom teeth  2010     Family History   Problem Relation Age of Onset     Hypertension Mother      Hyperlipidemia Mother      Cerebrovascular Disease Maternal Grandmother      Social History     Socioeconomic History     Marital status:      Spouse name: Jose Manuel     Enedina of  "children: None     Years of education: None     Highest education level: None   Occupational History     None   Social Needs     Financial resource strain: None     Food insecurity:     Worry: None     Inability: None     Transportation needs:     Medical: None     Non-medical: None   Tobacco Use     Smoking status: Never Smoker     Smokeless tobacco: Never Used   Substance and Sexual Activity     Alcohol use: No     Drug use: No     Sexual activity: Yes     Partners: Male   Lifestyle     Physical activity:     Days per week: None     Minutes per session: None     Stress: None   Relationships     Social connections:     Talks on phone: None     Gets together: None     Attends Adventism service: None     Active member of club or organization: None     Attends meetings of clubs or organizations: None     Relationship status: None     Intimate partner violence:     Fear of current or ex partner: None     Emotionally abused: None     Physically abused: None     Forced sexual activity: None   Other Topics Concern     Parent/sibling w/ CABG, MI or angioplasty before 65F 55M? Not Asked   Social History Narrative     None       Current Outpatient Medications:      Prenatal Vit-Fe Fumarate-FA (PRENATAL MULTIVITAMIN PLUS IRON) 27-0.8 MG TABS per tablet, Take 1 tablet by mouth daily, Disp: 100 tablet, Rfl: 3   No Known Allergies    Past medical, surgical, social and family history were reviewed and updated in EPIC.    ROS:   12 point review of systems negative other than symptoms noted below.      OBJECTIVE:                                                      EXAM:  /70 (BP Location: Right arm, Patient Position: Chair, Cuff Size: Adult Regular)   Ht 1.626 m (5' 4\")   Wt 56.9 kg (125 lb 6.4 oz)   LMP 10/21/2019   BMI 21.52 kg/m     BMI: Body mass index is 21.52 kg/m .  General: Alert and oriented, no distress.  Psychiatric: Mood and affect within normal limits.  Skin: Warm and dry, no lesions, rashes or " discolorations.  Neck: Neck supple. Thyroid palpbably normal in size and without nodularity.  Cardiovascular: Regular rate and rhythm, no murmurs, rubs or gallops.   Lungs:  Clear to auscultation bilaterally, breathing is unlabored.  Breasts:  Symmetric, no skin changes.  No dominant masses bilaterally.   Lymph:  No cervical, supraclavicular, infraclavicular, axillary or inguinal lymphadenopathy palpable.   Abdomen: Soft, nontender, no hepatosplenomegaly, no rebound or guarding, no masses, no hernias.   Vulva:  No external lesions, normal female hair distribution, no inguinal adenopathy.  No clear evidence of fissures now, slight erythema of the posterior fourchette.   Urethra:  Midline, non-tender, well supported, no discharge  Vagina:  Well-estrogenized, no abnormal discharge, no lesions  Musculoskeletal: extremities normal      COUNSELING:   Reviewed preventive health counseling, as reflected in patient instructions       Regular exercise       Healthy diet/nutrition       Family planning   reports that she has never smoked. She has never used smokeless tobacco.        ASSESSMENT/PLAN:                                                      24 year old female with satisfactory annual exam  (Z00.00) Annual physical exam  (primary encounter diagnosis)  Comment:   Plan: discussed family planning, continue prenatal and follow up if positive urine pregnancy test.   If pain with intercourse recurs, try soak and seal (instructions discussed). I suspect a fissure. If plan vasoline does not help, try 1% hcz ointment, and if no relief, see me back in clinic.    Amada Krause MD

## 2019-11-07 NOTE — NURSING NOTE
"Chief Complaint   Patient presents with     Physical     last pap 18, NIL.      Pain     with intercourse, first started 3 months ago, got better after a while. No new partners. One episode of abnormal bleeding following sex, that lasted until the next morning.       Initial /70 (BP Location: Right arm, Patient Position: Chair, Cuff Size: Adult Regular)   Ht 1.626 m (5' 4\")   Wt 56.9 kg (125 lb 6.4 oz)   LMP 10/21/2019   BMI 21.52 kg/m   Estimated body mass index is 21.52 kg/m  as calculated from the following:    Height as of this encounter: 1.626 m (5' 4\").    Weight as of this encounter: 56.9 kg (125 lb 6.4 oz).  BP completed using cuff size: regular    Questioned patient about current smoking habits.  Pt. has never smoked.          The following HM Due: NONE      Bruce Olivier CMA               "

## 2019-12-26 ENCOUNTER — PRENATAL OFFICE VISIT (OUTPATIENT)
Dept: NURSING | Facility: CLINIC | Age: 24
End: 2019-12-26
Payer: COMMERCIAL

## 2019-12-26 VITALS
DIASTOLIC BLOOD PRESSURE: 70 MMHG | BODY MASS INDEX: 21.97 KG/M2 | HEIGHT: 64 IN | WEIGHT: 128.7 LBS | SYSTOLIC BLOOD PRESSURE: 114 MMHG

## 2019-12-26 DIAGNOSIS — Z34.90 SUPERVISION OF NORMAL PREGNANCY: Primary | ICD-10-CM

## 2019-12-26 LAB
ABO + RH BLD: NORMAL
ABO + RH BLD: NORMAL
BLD GP AB SCN SERPL QL: NORMAL
BLOOD BANK CMNT PATIENT-IMP: NORMAL
ERYTHROCYTE [DISTWIDTH] IN BLOOD BY AUTOMATED COUNT: 12.7 % (ref 10–15)
HCG, QUAL URINE: POSITIVE
HCT VFR BLD AUTO: 37.8 % (ref 35–47)
HGB BLD-MCNC: 13.3 G/DL (ref 11.7–15.7)
MCH RBC QN AUTO: 30.8 PG (ref 26.5–33)
MCHC RBC AUTO-ENTMCNC: 35.2 G/DL (ref 31.5–36.5)
MCV RBC AUTO: 88 FL (ref 78–100)
PLATELET # BLD AUTO: 239 10E9/L (ref 150–450)
RBC # BLD AUTO: 4.32 10E12/L (ref 3.8–5.2)
SPECIMEN EXP DATE BLD: NORMAL
WBC # BLD AUTO: 5.2 10E9/L (ref 4–11)

## 2019-12-26 PROCEDURE — 87491 CHLMYD TRACH DNA AMP PROBE: CPT | Performed by: OBSTETRICS & GYNECOLOGY

## 2019-12-26 PROCEDURE — 99207 ZZC NO CHARGE NURSE ONLY: CPT

## 2019-12-26 PROCEDURE — 87340 HEPATITIS B SURFACE AG IA: CPT | Performed by: OBSTETRICS & GYNECOLOGY

## 2019-12-26 PROCEDURE — 86900 BLOOD TYPING SEROLOGIC ABO: CPT | Performed by: OBSTETRICS & GYNECOLOGY

## 2019-12-26 PROCEDURE — 86850 RBC ANTIBODY SCREEN: CPT | Performed by: OBSTETRICS & GYNECOLOGY

## 2019-12-26 PROCEDURE — 87591 N.GONORRHOEAE DNA AMP PROB: CPT | Performed by: OBSTETRICS & GYNECOLOGY

## 2019-12-26 PROCEDURE — 36415 COLL VENOUS BLD VENIPUNCTURE: CPT | Performed by: OBSTETRICS & GYNECOLOGY

## 2019-12-26 PROCEDURE — 86780 TREPONEMA PALLIDUM: CPT | Performed by: OBSTETRICS & GYNECOLOGY

## 2019-12-26 PROCEDURE — 85027 COMPLETE CBC AUTOMATED: CPT | Performed by: OBSTETRICS & GYNECOLOGY

## 2019-12-26 PROCEDURE — 86901 BLOOD TYPING SEROLOGIC RH(D): CPT | Performed by: OBSTETRICS & GYNECOLOGY

## 2019-12-26 PROCEDURE — 87389 HIV-1 AG W/HIV-1&-2 AB AG IA: CPT | Performed by: OBSTETRICS & GYNECOLOGY

## 2019-12-26 PROCEDURE — 87086 URINE CULTURE/COLONY COUNT: CPT | Performed by: OBSTETRICS & GYNECOLOGY

## 2019-12-26 PROCEDURE — 84703 CHORIONIC GONADOTROPIN ASSAY: CPT

## 2019-12-26 PROCEDURE — 86762 RUBELLA ANTIBODY: CPT | Performed by: OBSTETRICS & GYNECOLOGY

## 2019-12-26 ASSESSMENT — MIFFLIN-ST. JEOR: SCORE: 1308.78

## 2019-12-26 NOTE — NURSING NOTE
Patient is accompanied by /fob. Prenatal book and folder (containing standard educational hand-outs and brochures) given to patient. Information in folder reviewed. Questions answered. Brochure given on optional screening available to assess chromosomal anomalies. Pt advised to call the clinic if she has any questions or concerns related to her pregnancy. Prenatal labs obtained. New prenatal visit scheduled on 1/14/20 with Dr. Krause.    9w3d    Lab Results   Component Value Date    PAP NIL 05/29/2018           Patient supplied answers from flow sheet for:  Prenatal OB Questionnaire.  Past Medical History  Diabetes?: No  Hypertension : (!) Yes(with last pregnancy)  Heart disease, mitral valve prolapse or rheumatic fever?: No  An autoimmune disease such as lupus or rheumatoid arthritis?: No  Kidney disease or urinary tract infection?: (!) Yes(hx of UTI's)  Epilepsy, seizures or spells?: No  Migraine headaches?: (!) Yes  A stroke or loss of function or sensation?: No  Any other neurological problems?: No  Have you ever been treated for depression?: No  Are you having problems with crying spells or loss of self-esteem?: No  Have you ever required psychiatric care?: No  Have you ever had hepatitis, liver disease or jaundice?: No  Have you been treated for blood clots in your veins, deep vein thromosis, inflammation in the veins, thrombosis, phlebitis, pulmonary embolism or varicosities?: No  Have you had excessive bleeding after surgery or dental work?: No  Do you bleed more than other women after a cut or scratch?: No  Do you have a history of anemia?: No  Have you ever had thyroid problems or taken thyroid medication?: No   Do you have any endocrine problems?: No  Have you ever been in a major accident or suffered serious trauma?: No  Within the last year, has anyone hit, slapped, kicked or otherwise hurt you?: No  In the last year, has anyone forced you to have sex when you didn't want to?: No    Past Medical  History 2   Have you ever received a blood transfusion?: No  Would you refuse a blood transfusion if a doctor judged it to be medically necessary?: No   If you answered Yes, would you rather die than receive a blood transfusion?: No  If you answered Yes, is this for Zoroastrianism reasons?: No  Does anyone in your home smoke?: No  Do you use tobacco products?: No  Do you drink beer, wine or hard liquor?: No  Do you use any of the following: marijuana, speed, cocaine, heroin, hallucinogens or other drugs?: No   Is your blood type Rh negative?: No  Have you ever had abnormal antibodies in your blood?: No  Have you ever had asthma?: No  Have you ever had tuberculosis?: No  Do you have any allergies to drugs or over-the-counter medications?: No  Allergies: Dust Mites, Aspartame, Ethanol, Venlafaxine, Hydrochloride, Sertraline: No  Have you had any breast problems?: No  Have you ever ?: (!) Yes(breast fed first child x 5 months)  Have you had any gynecological surgical procedures such as cervical conization, a LEEP procedure, laser treatment, cryosurgery of the cervix or a dilation and curettage, etc?: No  Have you ever had any other surgical procedures?: No  Have you been hospitalized for a nonsurgical reason excluding normal delivery?: (!) Yes(postpartum preeclampsia)  Have you ever had any anesthetic complications?: No  Have you ever had an abnormal pap smear?: No    Past Medical History (Continued)  Do you have a history of abnormalities of the uterus?: No  Did your mother take WILL or any other hormones when she was pregnant with you?: No  Did it take you more than a year to become pregnant?: No  Have you ever been evaluated or treated for infertility?: No  Is there a history of medical problems in your family, which you feel may be important to this pregnancy?: No  Do you have any other problems we have not asked about which you feel may be important to this pregnancy?: No    Symptoms since last menstrual  period  Do you have any of the following symptoms: abdominal pain, blood in stools or urine, chest pain, shortness of breath, coughing or vomiting up blood, your heart racing or skipping beats, nausea and vomiting, pain on urination or vaginal discharge or bleed: (!) Yes(nausea, vomiting, breast tenderness, fatigue)  Current medications, including over-the-counter medications, you are using? (If not applicable answer none): pn vitamin  Will the patient be 35 years old or older at the time of delivery?: No    Has the patient, baby's father or anyone in either family had:  Thalassemia (Italian, Greek, Mediterranean or  background only) and an MCV result less than 80?: No  Neural tube defect such as meningomyelocele, spina bifida or anencephaly?: No  Congenital heart defect?: No  Down's Syndrome?: (!) Yes(fob maternal 2nd cousin)  Shahid-Sachs disease (Hinduism, Cajun, Citizen of the [place]-Kittitian)?: No  Sickle cell disease or trait ()?: No  Hemophilia or other inherited problems of blood?: No  Muscular dystrophy?: (!) Yes(fob maternal uncle)  Cystic fibrosis?: No  Harford's chorea?: No  Mental retardation/autism?: No  If yes, was the person tested for fragile X?: No  Any other inherited genetic or chromosomal disorder?: No  Maternal metabolic disorder (e.g Insulin-dependent diabetes, PKU)?: No  A child with birth defects not listed above?: No  Recurrent pregnancy loss or stillbirth?: No   Has the patient had any medications/street drugs/alcohol since her last menstrual period?: (!) Yes(sudafed)  Does the patient or baby's father have any other genetic risks?: No    Infection History   Do you object to being tested for Hepatitis B?: No  Do you object to being tested for HIV?: No   Do you feel that you are at high risk for coming in contact with the AIDS virus?: No  Have you ever been treated for tuberculosis?: No  Have you ever had a positive skin test for tuberculosis?: No  Do you live with someone who has tuberculosis?:  No  Have you ever been exposed to tuberculosis?: No  Do you have genital herpes?: No  Does your partner have genital herpes?: No  Have you had a viral illness since your last period?: (!) Yes(sinus infection)  Have you ever had gonorrhea, chlamydia, syphilis, venereal warts, trichomoniasis, pelvic inflammatory disease or any other sexually transmitted disease?: No  Do you know if you are a genital group B streptococcus carrier?: No  Have you had chicken pox/varicella?: No   Have you been vaccinated against chicken Pox?: (!) Yes  Have you had any other infectious diseases?: No      Liza GREGORY RN

## 2019-12-27 LAB
BACTERIA SPEC CULT: NORMAL
C TRACH DNA SPEC QL NAA+PROBE: NEGATIVE
HBV SURFACE AG SERPL QL IA: NONREACTIVE
HIV 1+2 AB+HIV1 P24 AG SERPL QL IA: NONREACTIVE
N GONORRHOEA DNA SPEC QL NAA+PROBE: NEGATIVE
RUBV IGG SERPL IA-ACNC: 29 IU/ML
SPECIMEN SOURCE: NORMAL
T PALLIDUM AB SER QL: NONREACTIVE

## 2020-01-02 DIAGNOSIS — Z34.90 SUPERVISION OF NORMAL PREGNANCY: ICD-10-CM

## 2020-01-14 ENCOUNTER — PRENATAL OFFICE VISIT (OUTPATIENT)
Dept: OBGYN | Facility: CLINIC | Age: 25
End: 2020-01-14
Payer: COMMERCIAL

## 2020-01-14 VITALS
HEIGHT: 64 IN | SYSTOLIC BLOOD PRESSURE: 120 MMHG | DIASTOLIC BLOOD PRESSURE: 70 MMHG | WEIGHT: 130 LBS | BODY MASS INDEX: 22.2 KG/M2

## 2020-01-14 DIAGNOSIS — Z34.82 ENCOUNTER FOR SUPERVISION OF OTHER NORMAL PREGNANCY IN SECOND TRIMESTER: Primary | ICD-10-CM

## 2020-01-14 DIAGNOSIS — Z87.59 HISTORY OF PRE-ECLAMPSIA: ICD-10-CM

## 2020-01-14 DIAGNOSIS — Z23 NEED FOR PROPHYLACTIC VACCINATION AND INOCULATION AGAINST INFLUENZA: ICD-10-CM

## 2020-01-14 PROCEDURE — 99000 SPECIMEN HANDLING OFFICE-LAB: CPT | Performed by: OBSTETRICS & GYNECOLOGY

## 2020-01-14 PROCEDURE — 36415 COLL VENOUS BLD VENIPUNCTURE: CPT | Performed by: OBSTETRICS & GYNECOLOGY

## 2020-01-14 PROCEDURE — 90471 IMMUNIZATION ADMIN: CPT | Performed by: OBSTETRICS & GYNECOLOGY

## 2020-01-14 PROCEDURE — 99214 OFFICE O/P EST MOD 30 MIN: CPT | Performed by: OBSTETRICS & GYNECOLOGY

## 2020-01-14 PROCEDURE — 40000791 ZZHCL STATISTIC VERIFI PRENATAL TRISOMY 21,18,13: Mod: 90 | Performed by: OBSTETRICS & GYNECOLOGY

## 2020-01-14 PROCEDURE — 90686 IIV4 VACC NO PRSV 0.5 ML IM: CPT | Performed by: OBSTETRICS & GYNECOLOGY

## 2020-01-14 ASSESSMENT — MIFFLIN-ST. JEOR: SCORE: 1314.68

## 2020-01-14 NOTE — NURSING NOTE
"Chief Complaint   Patient presents with     Prenatal Care       Initial /70   Ht 1.626 m (5' 4\")   Wt 59 kg (130 lb)   LMP 10/21/2019 (Exact Date)   Breastfeeding No   BMI 22.31 kg/m   Estimated body mass index is 22.31 kg/m  as calculated from the following:    Height as of this encounter: 1.626 m (5' 4\").    Weight as of this encounter: 59 kg (130 lb).  BP completed using cuff size: regular    Questioned patient about current smoking habits.  Pt. has never smoked.          The following HM Due: NONE  13w4d  + nausea or vomiting  + cramping  - bleeding  Zehra Jain LPN                 "

## 2020-01-14 NOTE — PROGRESS NOTES
This is a 24 year old female patient,   who presents for her first obstetrical visit. This pregnancy is Planned, Desired.    EDC 2020 by Previous US which makes her 13w4d  today.  Her cycles are regular.  Her last menstrual period was normal. Since her LMP, she has experienced  nausea and fatigue.  She denies abdominal pain, vaginal discharge, pelvic pain and vaginal bleeding. Ultrasound in the 1st trimester showed EDC inconsistent with dates by LMP.     OB History    Para Term  AB Living   4 1 1 0 1 1   SAB TAB Ectopic Multiple Live Births   0 0 0 0 1      # Outcome Date GA Lbr Jose/2nd Weight Sex Delivery Anes PTL Lv   4 Current            3 Term 18 37w2d 05:13 / 04:43 3.23 kg (7 lb 1.9 oz) F Vag-Spont EPI  SEAMUS      Complications: Preeclampsia/Hypertension      Name: Yasmin      Apgar1: 9  Apgar5: 9   2 AB 10/2013 5w0d          1                Obstetric Comments   Preeclampsia/HTN       History of GDM: No,  PTL : No,  History of HTN in pregnancy: Yes - preeclampsia  Thrombocytopenia: No,  Shoulder dystocia: No,  Vacuum Extraction: No  PPH: No   3rd of 4th degree laceration: No.   Other complications: No      Since her last LMP she denies use of alcohol, tobacco and street drugs.    HISTORY:  Past Medical History:   Diagnosis Date     Hypertension     1st pregnancy     Migraine      Past Surgical History:   Procedure Laterality Date     wisdom teeth  2010     Family History   Problem Relation Age of Onset     Hypertension Mother      Hyperlipidemia Mother      Cerebrovascular Disease Maternal Grandmother      Social History     Socioeconomic History     Marital status:      Spouse name: Jose Manuel     Number of children: 1     Years of education: None     Highest education level: None   Occupational History     Occupation: student   Social Needs     Financial resource strain: None     Food insecurity:     Worry: None     Inability: None     Transportation needs:      "Medical: None     Non-medical: None   Tobacco Use     Smoking status: Never Smoker     Smokeless tobacco: Never Used   Substance and Sexual Activity     Alcohol use: No     Drug use: No     Sexual activity: Yes     Partners: Male   Lifestyle     Physical activity:     Days per week: None     Minutes per session: None     Stress: None   Relationships     Social connections:     Talks on phone: None     Gets together: None     Attends Judaism service: None     Active member of club or organization: None     Attends meetings of clubs or organizations: None     Relationship status: None     Intimate partner violence:     Fear of current or ex partner: None     Emotionally abused: None     Physically abused: None     Forced sexual activity: None   Other Topics Concern     Parent/sibling w/ CABG, MI or angioplasty before 65F 55M? Not Asked   Social History Narrative     None     Current Outpatient Medications   Medication Sig     Prenatal Vit-Fe Fumarate-FA (PRENATAL MULTIVITAMIN PLUS IRON) 27-0.8 MG TABS per tablet Take 1 tablet by mouth daily     No current facility-administered medications for this visit.      No Known Allergies    Past medical, surgical, social and family history were reviewed and updated in EPIC.    ROS:   12 point review of systems negative other than symptoms noted below or in the HPI.  12 point review of systems negative other than symptoms noted below.    EXAM:  /70   Ht 1.626 m (5' 4\")   Wt 59 kg (130 lb)   LMP 10/21/2019 (Exact Date)   Breastfeeding No   BMI 22.31 kg/m     BMI: Body mass index is 22.31 kg/m .    EXAM:  Constitutional: Appearance: Well nourished, well developed alert, in no acute distress  Chest:  Respiratory Effort:  Breathing unlabored  Cardiovascular:Heart    Auscultation:  Regular rate, normal rhythm, no murmurs present  Gastrointestinal:  Abdominal Examination:  Abdomen nontender to palpation, tone normal without     rigidity or guarding, no masses present, " umbilicus without lesions    Liver and speen:  No hepatomegaly present, liver nontender to palpation    Hernias:  No hernias present    FHTs auscultated at 170.  Skin:  General Inspection:  No rashes present, no lesions present, no areas of  discoloration.  Neurologic/Psychiatric:    Mental Status:  Oriented X3       Pelvis: not needed today.    ASSESSMENT:      ICD-10-CM    1. Supervision of normal pregnancy Z34.90 Non Invasive Prenatal Test Cell Free DNA   2. Need for prophylactic vaccination and inoculation against influenza Z23 INFLUENZA VACCINE IM > 6 MONTHS VALENT IIV4 [42387]     Vaccine Administration, Initial [25099]       PLAN:    Prenatal labs reviewed. She has no questions.    Discussed options for screening for and diagnosis of chromosomal anomalies, including first trimester screen, noninvasive prenatal testing/cell-free fetal DNA testing, CVS/amniocentesis, quad screen, and ultrasound or comprehensive Level II US at 18-20 weeks. She is electing noninvasive prenatal testing. This was ordered today.    For history of preeclampsia, will start ASA 81 mg daily, can start today. Discussed risks, benefits, and alternatives and the reasoning behind this.    Reviewed early pregnancy education, diet, exercise, prenatal vitamins, intercourse. Reviewed the call schedule, labor and delivery, and the schedule of prenatal visits.    Follow up in 4 weeks. She is encouraged to call sooner with questions or concerns.      Amada Krause MD

## 2020-01-20 ENCOUNTER — TELEPHONE (OUTPATIENT)
Dept: OBGYN | Facility: CLINIC | Age: 25
End: 2020-01-20

## 2020-01-21 ENCOUNTER — TELEPHONE (OUTPATIENT)
Dept: OBGYN | Facility: CLINIC | Age: 25
End: 2020-01-21

## 2020-01-21 ENCOUNTER — ALLIED HEALTH/NURSE VISIT (OUTPATIENT)
Dept: NURSING | Facility: CLINIC | Age: 25
End: 2020-01-21
Payer: COMMERCIAL

## 2020-01-21 DIAGNOSIS — Z34.90 SUPERVISION OF NORMAL PREGNANCY: Primary | ICD-10-CM

## 2020-01-21 PROCEDURE — 99207 ZZC NO CHARGE NURSE ONLY: CPT
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
## 2020-01-21 NOTE — NURSING NOTE
See TE encounter from today. Pt here to hear FHT.     bpm.    Denies vag bleeding.     Advised to call if any cramping and/or vag bleeding.    Liza GREGORY RN

## 2020-01-22 LAB — LAB SCANNED RESULT: NORMAL

## 2020-02-13 ENCOUNTER — PRENATAL OFFICE VISIT (OUTPATIENT)
Dept: OBGYN | Facility: CLINIC | Age: 25
End: 2020-02-13
Payer: COMMERCIAL

## 2020-02-13 VITALS — WEIGHT: 131 LBS | DIASTOLIC BLOOD PRESSURE: 64 MMHG | SYSTOLIC BLOOD PRESSURE: 106 MMHG | BODY MASS INDEX: 22.49 KG/M2

## 2020-02-13 DIAGNOSIS — Z34.82 ENCOUNTER FOR SUPERVISION OF OTHER NORMAL PREGNANCY IN SECOND TRIMESTER: Primary | ICD-10-CM

## 2020-02-13 PROCEDURE — 99212 OFFICE O/P EST SF 10 MIN: CPT | Performed by: OBSTETRICS & GYNECOLOGY

## 2020-02-13 NOTE — NURSING NOTE
"Chief Complaint   Patient presents with     Prenatal Care       Initial /64   Wt 59.4 kg (131 lb)   LMP 10/21/2019 (Exact Date)   BMI 22.49 kg/m   Estimated body mass index is 22.49 kg/m  as calculated from the following:    Height as of 20: 1.626 m (5' 4\").    Weight as of this encounter: 59.4 kg (131 lb).  BP completed using cuff size: regular    Questioned patient about current smoking habits.  Pt. has never smoked.          The following HM Due: NONE      Susana Pina MA             "

## 2020-02-13 NOTE — PROGRESS NOTES
PROBLEM LIST  LABS: Apos/RI    1. History preeclampsia first pregnancy: on ASA  81 mg daily.    Noninvasive prenatal testing was normal, BOY. Doing well. Follow up in 4 weeks, US ordered for 2 weeks.  Is moving to [place] and will transfer care in May, but will continue to follow up with us until then.    Amada Krause MD

## 2020-03-03 ENCOUNTER — ANCILLARY PROCEDURE (OUTPATIENT)
Dept: ULTRASOUND IMAGING | Facility: CLINIC | Age: 25
End: 2020-03-03
Attending: OBSTETRICS & GYNECOLOGY
Payer: COMMERCIAL

## 2020-03-03 DIAGNOSIS — Z34.82 ENCOUNTER FOR SUPERVISION OF OTHER NORMAL PREGNANCY IN SECOND TRIMESTER: ICD-10-CM

## 2020-03-03 PROCEDURE — 76805 OB US >/= 14 WKS SNGL FETUS: CPT | Performed by: OBSTETRICS & GYNECOLOGY

## 2020-03-04 DIAGNOSIS — Z34.82 ENCOUNTER FOR SUPERVISION OF OTHER NORMAL PREGNANCY IN SECOND TRIMESTER: Primary | ICD-10-CM

## 2020-03-17 ENCOUNTER — PRENATAL OFFICE VISIT (OUTPATIENT)
Dept: OBGYN | Facility: CLINIC | Age: 25
End: 2020-03-17
Payer: COMMERCIAL

## 2020-03-17 VITALS — WEIGHT: 137 LBS | DIASTOLIC BLOOD PRESSURE: 66 MMHG | SYSTOLIC BLOOD PRESSURE: 110 MMHG | BODY MASS INDEX: 23.52 KG/M2

## 2020-03-17 DIAGNOSIS — Z34.82 ENCOUNTER FOR SUPERVISION OF OTHER NORMAL PREGNANCY IN SECOND TRIMESTER: Primary | ICD-10-CM

## 2020-03-17 PROCEDURE — 99212 OFFICE O/P EST SF 10 MIN: CPT | Performed by: OBSTETRICS & GYNECOLOGY

## 2020-03-17 NOTE — NURSING NOTE
"Chief Complaint   Patient presents with     Prenatal Care       Initial /66   Wt 62.1 kg (137 lb)   LMP 10/21/2019 (Exact Date)   Breastfeeding No   BMI 23.52 kg/m   Estimated body mass index is 23.52 kg/m  as calculated from the following:    Height as of 20: 1.626 m (5' 4\").    Weight as of this encounter: 62.1 kg (137 lb).  BP completed using cuff size: regular    Questioned patient about current smoking habits.  Pt. has never smoked.          22w4d  + FM noted  + headache   + heartburn  + constipation  - bleeding  Zehra Jain LPN             "

## 2020-03-17 NOTE — PROGRESS NOTES
PROBLEM LIST  LABS: Apos/RI    1. History preeclampsia first pregnancy: on ASA  81 mg daily.    Noninvasive prenatal testing was normal, BOY. Doing well.   US was normal but facial views and cardiac views inadequate--repeat next week.  Is moving to [place] and will transfer care in May, but will continue to follow up with us until then.    Amada Krause MD

## 2020-03-24 DIAGNOSIS — Z34.82 ENCOUNTER FOR SUPERVISION OF OTHER NORMAL PREGNANCY IN SECOND TRIMESTER: ICD-10-CM

## 2021-01-03 ENCOUNTER — HEALTH MAINTENANCE LETTER (OUTPATIENT)
Age: 26
End: 2021-01-03

## 2021-10-10 ENCOUNTER — HEALTH MAINTENANCE LETTER (OUTPATIENT)
Age: 26
End: 2021-10-10

## 2022-01-29 ENCOUNTER — HEALTH MAINTENANCE LETTER (OUTPATIENT)
Age: 27
End: 2022-01-29

## 2022-09-18 ENCOUNTER — HEALTH MAINTENANCE LETTER (OUTPATIENT)
Age: 27
End: 2022-09-18

## 2023-05-06 ENCOUNTER — HEALTH MAINTENANCE LETTER (OUTPATIENT)
Age: 28
End: 2023-05-06